# Patient Record
Sex: FEMALE | Race: WHITE | NOT HISPANIC OR LATINO | Employment: OTHER | URBAN - METROPOLITAN AREA
[De-identification: names, ages, dates, MRNs, and addresses within clinical notes are randomized per-mention and may not be internally consistent; named-entity substitution may affect disease eponyms.]

---

## 2017-01-23 ENCOUNTER — APPOINTMENT (OUTPATIENT)
Dept: RADIOLOGY | Facility: MEDICAL CENTER | Age: 72
End: 2017-01-23
Attending: FAMILY MEDICINE
Payer: MEDICARE

## 2017-03-09 ENCOUNTER — HOSPITAL ENCOUNTER (OUTPATIENT)
Dept: RADIOLOGY | Facility: MEDICAL CENTER | Age: 72
End: 2017-03-09

## 2017-03-13 ENCOUNTER — HOSPITAL ENCOUNTER (OUTPATIENT)
Dept: RADIOLOGY | Facility: MEDICAL CENTER | Age: 72
End: 2017-03-13

## 2017-03-20 ENCOUNTER — HOSPITAL ENCOUNTER (OUTPATIENT)
Dept: RADIOLOGY | Facility: MEDICAL CENTER | Age: 72
End: 2017-03-20
Attending: FAMILY MEDICINE
Payer: MEDICARE

## 2017-03-20 DIAGNOSIS — Z12.31 SCREENING MAMMOGRAM, ENCOUNTER FOR: ICD-10-CM

## 2017-03-20 PROCEDURE — 77063 BREAST TOMOSYNTHESIS BI: CPT

## 2017-07-14 ENCOUNTER — SLEEP CENTER VISIT (OUTPATIENT)
Dept: SLEEP MEDICINE | Facility: MEDICAL CENTER | Age: 72
End: 2017-07-14
Payer: MEDICARE

## 2017-07-14 ENCOUNTER — HOSPITAL ENCOUNTER (OUTPATIENT)
Dept: LAB | Facility: MEDICAL CENTER | Age: 72
End: 2017-07-14
Attending: FAMILY MEDICINE
Payer: MEDICARE

## 2017-07-14 VITALS
SYSTOLIC BLOOD PRESSURE: 138 MMHG | HEART RATE: 84 BPM | HEIGHT: 65 IN | BODY MASS INDEX: 41.99 KG/M2 | DIASTOLIC BLOOD PRESSURE: 60 MMHG | RESPIRATION RATE: 18 BRPM | WEIGHT: 252 LBS | TEMPERATURE: 98.2 F | OXYGEN SATURATION: 96 %

## 2017-07-14 DIAGNOSIS — G47.30 SLEEP APNEA, UNSPECIFIED TYPE: ICD-10-CM

## 2017-07-14 DIAGNOSIS — R06.83 SNORING: ICD-10-CM

## 2017-07-14 DIAGNOSIS — G47.10 HYPERSOMNOLENCE: ICD-10-CM

## 2017-07-14 LAB
ALBUMIN SERPL BCP-MCNC: 4.1 G/DL (ref 3.2–4.9)
ALBUMIN/GLOB SERPL: 1.2 G/DL
ALP SERPL-CCNC: 67 U/L (ref 30–99)
ALT SERPL-CCNC: 16 U/L (ref 2–50)
ANION GAP SERPL CALC-SCNC: 13 MMOL/L (ref 0–11.9)
AST SERPL-CCNC: 16 U/L (ref 12–45)
BASOPHILS # BLD AUTO: 0.5 % (ref 0–1.8)
BASOPHILS # BLD: 0.06 K/UL (ref 0–0.12)
BILIRUB SERPL-MCNC: 0.5 MG/DL (ref 0.1–1.5)
BUN SERPL-MCNC: 35 MG/DL (ref 8–22)
CALCIUM SERPL-MCNC: 9.3 MG/DL (ref 8.5–10.5)
CHLORIDE SERPL-SCNC: 104 MMOL/L (ref 96–112)
CO2 SERPL-SCNC: 22 MMOL/L (ref 20–33)
CREAT SERPL-MCNC: 1.31 MG/DL (ref 0.5–1.4)
EOSINOPHIL # BLD AUTO: 0.14 K/UL (ref 0–0.51)
EOSINOPHIL NFR BLD: 1.3 % (ref 0–6.9)
ERYTHROCYTE [DISTWIDTH] IN BLOOD BY AUTOMATED COUNT: 50.4 FL (ref 35.9–50)
EST. AVERAGE GLUCOSE BLD GHB EST-MCNC: 126 MG/DL
GFR SERPL CREATININE-BSD FRML MDRD: 40 ML/MIN/1.73 M 2
GLOBULIN SER CALC-MCNC: 3.4 G/DL (ref 1.9–3.5)
GLUCOSE SERPL-MCNC: 98 MG/DL (ref 65–99)
HBA1C MFR BLD: 6 % (ref 0–5.6)
HCT VFR BLD AUTO: 41.4 % (ref 37–47)
HGB BLD-MCNC: 13.4 G/DL (ref 12–16)
IMM GRANULOCYTES # BLD AUTO: 0.04 K/UL (ref 0–0.11)
IMM GRANULOCYTES NFR BLD AUTO: 0.4 % (ref 0–0.9)
IRON SERPL-MCNC: 71 UG/DL (ref 40–170)
LYMPHOCYTES # BLD AUTO: 2.22 K/UL (ref 1–4.8)
LYMPHOCYTES NFR BLD: 20.3 % (ref 22–41)
MCH RBC QN AUTO: 29.8 PG (ref 27–33)
MCHC RBC AUTO-ENTMCNC: 32.4 G/DL (ref 33.6–35)
MCV RBC AUTO: 92.2 FL (ref 81.4–97.8)
MONOCYTES # BLD AUTO: 0.66 K/UL (ref 0–0.85)
MONOCYTES NFR BLD AUTO: 6 % (ref 0–13.4)
NEUTROPHILS # BLD AUTO: 7.84 K/UL (ref 2–7.15)
NEUTROPHILS NFR BLD: 71.5 % (ref 44–72)
NRBC # BLD AUTO: 0 K/UL
NRBC BLD AUTO-RTO: 0 /100 WBC
PLATELET # BLD AUTO: 253 K/UL (ref 164–446)
PMV BLD AUTO: 11.8 FL (ref 9–12.9)
POTASSIUM SERPL-SCNC: 4.5 MMOL/L (ref 3.6–5.5)
PROT SERPL-MCNC: 7.5 G/DL (ref 6–8.2)
RBC # BLD AUTO: 4.49 M/UL (ref 4.2–5.4)
SODIUM SERPL-SCNC: 139 MMOL/L (ref 135–145)
VIT B12 SERPL-MCNC: 842 PG/ML (ref 211–911)
WBC # BLD AUTO: 11 K/UL (ref 4.8–10.8)

## 2017-07-14 PROCEDURE — 81291 MTHFR GENE: CPT

## 2017-07-14 PROCEDURE — 80053 COMPREHEN METABOLIC PANEL: CPT

## 2017-07-14 PROCEDURE — 83036 HEMOGLOBIN GLYCOSYLATED A1C: CPT | Mod: GA

## 2017-07-14 PROCEDURE — 36415 COLL VENOUS BLD VENIPUNCTURE: CPT

## 2017-07-14 PROCEDURE — 99204 OFFICE O/P NEW MOD 45 MIN: CPT | Performed by: INTERNAL MEDICINE

## 2017-07-14 PROCEDURE — 85025 COMPLETE CBC W/AUTO DIFF WBC: CPT

## 2017-07-14 PROCEDURE — 82607 VITAMIN B-12: CPT

## 2017-07-14 PROCEDURE — 83540 ASSAY OF IRON: CPT

## 2017-07-14 RX ORDER — DIPHENHYDRAMINE HCL 25 MG
25 CAPSULE ORAL EVERY 6 HOURS PRN
COMMUNITY
End: 2017-12-01

## 2017-07-14 NOTE — MR AVS SNAPSHOT
"        Kaia Sorto   2017 1:00 PM   Sleep Center Visit   MRN: [id number]    Department:  [facility]   Dept Phone:  [phone]    Description:  Female : 1945   Provider:  Julio Cesar ALVAREZ M.D.           Reason for Visit     New Patient Evaluate TALIA      Allergies as of 2017     Allergen Noted Reactions    Morphine 2011   Vomiting      You were diagnosed with     Sleep apnea, unspecified type   [4574948]       Hypersomnolence   [532336]       Snoring   [267132]         Vital Signs     Blood Pressure Pulse Temperature Respirations Height Weight    138/60 mmHg 84 36.8 °C (98.3 °F) 18 1.651 m (5' 5\") 114.306 kg (252 lb)    Body Mass Index Oxygen Saturation Smoking Status             41.93 kg/m2 96% Former Smoker         Basic Information     Date Of Birth Sex Race Ethnicity Preferred Language    1945 Female White Unknown English      Your appointments     Aug 10, 2017  2:00 PM   Sleep Study Home with SLEEP CLINIC   [facility] (--)    [address] [id number] [phone]            Aug 16, 2017  8:00 AM   Follow UP with MOE Jackson   [facility] (--)    [address] [id number] [phone]              Problem List              ICD-10-CM Priority Class Noted - Resolved    Carpal tunnel syndrome G56.00   11/3/2016 - Present      Health Maintenance        Date Due Completion Dates    IMM DTaP/Tdap/Td Vaccine (1 - Tdap) 3/3/1966 ---    PAP SMEAR 3/3/1968 ---    COLONOSCOPY 3/3/1997 ---    IMM ZOSTER VACCINE 3/3/2007 ---    BONE DENSITY 3/3/2012 ---    IMM PNEUMOCOCCAL 65+ (ADULT) LOW/MEDIUM RISK SERIES (1 of 2 - PCV13) 3/3/2012 ---    IMM INFLUENZA (1) 2017 ---    MAMMOGRAM 3/20/2018 3/20/2017, 10/18/2010, 2009, 2009, 2008, 2008, 2007, 2007            Current Immunizations     No immunizations on file.      Below and/or attached " are the medications your provider expects you to take. Review all of your home medications and newly ordered medications with your provider and/or pharmacist. Follow medication instructions as directed by your provider and/or pharmacist. Please keep your medication list with you and share with your provider. Update the information when medications are discontinued, doses are changed, or new medications (including over-the-counter products) are added; and carry medication information at all times in the event of emergency situations     Allergies:  MORPHINE - Vomiting               Medications  Valid as of: July 14, 2017 -  2:05 PM    Generic Name Brand Name Tablet Size Instructions for use    DiphenhydrAMINE HCl (Cap) BENADRYL 25 MG Take 25 mg by mouth every 6 hours as needed.        HydroCHLOROthiazide (Tab) HYDRODIURIL 25 MG Take 25 mg by mouth every day.        Levothyroxine Sodium (Tab) SYNTHROID 75 MCG Take 75 mcg by mouth Every morning on an empty stomach.        Valsartan (Tab) DIOVAN 320 MG Take 320 mg by mouth every day.        .                 Medicines prescribed today were sent to:     [facility]Y #[id number] - [facility] [address]    [address] [address]    Phone: [phone] Fax: [phone]    Open 24 Hours?: No      Medication refill instructions:       If your prescription bottle indicates you have medication refills left, it is not necessary to call your provider’s office. Please contact your pharmacy and they will refill your medication.    If your prescription bottle indicates you do not have any refills left, you may request refills at any time through one of the following ways: The online [employer] system (except Urgent Care), by calling your provider’s office, or by asking your pharmacy to contact your provider’s office with a refill request. Medication refills are processed only during regular business hours and may not be available until the next business day. Your provider  may request additional information or to have a follow-up visit with you prior to refilling your medication.   *Please Note: Medication refills are assigned a new Rx number when refilled electronically. Your pharmacy may indicate that no refills were authorized even though a new prescription for the same medication is available at the pharmacy. Please request the medicine by name with the pharmacy before contacting your provider for a refill.        Your To Do List     Future Labs/Procedures Complete By Expires    OVERNIGHT HOME SLEEP STUDY  As directed 7/14/2018         [facility] Status: Patient Declined

## 2017-07-17 NOTE — PROGRESS NOTES
CC:  Snoring and daytime somnolence, suspected sleep apnea hypopnea syndrome.    HPI:   Ms. Sorto is a 72-year-old woman referred by Dr. Elian Torres to assist in evaluation and management of suspected sleep-disordered breathing.    She is a long history of snoring and daytime somnolence.  About 2 years ago she underwent a home sleep test through her dentist's office.  A dental appliance was fabricated but she did not accommodate to the use of that device feeling that it caused intolerable oral discomfort.  We have partial results from a study that was done on August 18, 2015 that seems to show significant number of sleep breathing events.    She has a regular sleep schedule, as confirmed by the sleep diary, with bedtime between 11 PM and midnight.  She falls asleep within 30-60 minutes but does not perceive significant insomnia.  She awakens once or twice on an average night, sometimes for pet care.  She arises between 7 and 8 AM without fatigue, grogginess or morning headaches. She does have a history of snoring but we don't have any current information on possible cyclic breathing or apnea.  She is somewhat tired during the day and may doze off while reading or watching television.  She naps on a daily basis for about 30 minutes.  Her [place] sleepiness score is 5 points.  She drinks caffeinated beverages moderately and does not use substances to maintain wakefulness.  She does sometimes use over-the-counter Benadryl to facilitate sleep onset.  She does not have symptoms suggesting narcolepsy, parasomnia or restless leg syndrome.    She does have a history of systemic arterial hypertension treated with Diovan and hydrochlorothiazide.  She is on thyroid hormone replacement therapy and she has a history of painful heel spurs treated with nabumetone and that pain may interfere with her sleep.        Patient Active Problem List    Diagnosis Date Noted   • Carpal tunnel syndrome 11/03/2016       Past Medical  "History   Diagnosis Date   • Arthritis      wrists   • Hypertension    • Disorder of thyroid    • Depression    • Hypothyroidism    • Chickenpox    • Influenza    • Mumps    • Tonsillitis    • Snoring    • Sleep apnea        Past Surgical History   Procedure Laterality Date   • Other       bilateral cataracts removed   • Other abdominal surgery  2006     cholecystectomy   • Other orthopedic surgery Left 2005     rotator cuff repair   • Other orthopedic surgery Right 1978     rt shoulder dislocation repair   • Carpal tunnel endoscopic Right 11/3/2016     Procedure: ENDOSCOPIC CARPAL TUNNEL RELEASE;  Surgeon: Honorio Pritchard M.D.;  Location: SURGERY [facility];  Service:    • Cholecystectomy     • Carpal tunnel release         Family History   Problem Relation Age of Onset   • Heart Failure Mother    • Heart Disease Father    • Sleep Apnea Neg Hx        Social History     Social History   • Marital Status: Single     Spouse Name: N/A   • Number of Children: N/A   • Years of Education: N/A     Occupational History   • Not on file.     Social History Main Topics   • Smoking status: Former Smoker -- 0.25 packs/day for 10 years     Types: Cigarettes     Quit date: 01/01/1981   • Smokeless tobacco: Never Used   • Alcohol Use: Yes      Comment: Occ   • Drug Use: No   • Sexual Activity: Not on file     Other Topics Concern   • Not on file     Social History Narrative       Current Outpatient Prescriptions   Medication Sig Dispense Refill   • diphenhydrAMINE (BENADRYL ALLERGY) 25 MG capsule Take 25 mg by mouth every 6 hours as needed.     • levothyroxine (SYNTHROID) 75 MCG Tab Take 75 mcg by mouth Every morning on an empty stomach.     • valsartan (DIOVAN) 320 MG tablet Take 320 mg by mouth every day.     • hydrochlorothiazide (HYDRODIURIL) 25 MG TABS Take 25 mg by mouth every day.       No current facility-administered medications for this visit.    \"CURRENT RX\"      Allergies: Morphine      ROS  Positive for the " "sleep, cardiac, endocrine and orthopedic issues reviewed above.  She wears corrective lenses but has no diplopia.  She does not have regular rhinitis or tinnitus.  She denies heartburn or abdominal discomfort, unusual cough or dyspnea, chest pain or palpitations.  All other aspects of the CPT review of systems process are negative.      Physical Exam:   /60 mmHg  Pulse 84  Temp(Src) 36.8 °C (98.2 °F)  Resp 18  Ht 1.651 m (5' 5\")  Wt 114.306 kg (252 lb)  BMI 41.93 kg/m2  SpO2 96%. Neck circumference is 16.5 inches.  Head and neck examination demonstrates no mucosal lesion, purulent drainage or evident polyps. The pharynx is benign with a Mallampati III presentation. The neck is supple without thyromegaly. On chest examination there are symmetrical bilateral breath sounds without rales, wheezing or consolidation. On cardiac examination, the apical impulse and heart sounds are normal and the rhythm is regular. There is no murmur, gallop or rub and no jugular venous distention. The abdomen is soft with active bowel sounds and no palpable hepatosplenomegaly, mass, guarding or rebound. The extremities show no clubbing, cyanosis or edema and no signs of deep venous thrombosis. There is no warmth, redness, tenderness or palpable venous cord in the calves. The skin is clear, warm and dry. There is no unusual peripheral lymphadenopathy. Peripheral pulses are palpable in all 4 extremities. On neurologic examination, cranial nerve function is intact, motor tone is symmetrical, and the patient is alert, oriented and responsive.       Problems:  1. Sleep apnea, unspecified type  She presents with snoring and excessive daytime somnolence.  She has a crowded posterior pharyngeal airway, a neck circumference of 16.5 inches, and a body mass index of 41.9.  She has a history of systemic arterial hypertension and hypothyroidism.    2. Hypersomnolence  Probably related to the suspected sleep-disordered breathing.  She is " bending sufficient time in bed and does not seem to have major issues with sleep hygiene.    3. Snoring      Plan:   Home sleep test.    Return visit afterwards to discuss test results and treatment options.    We appreciate the opportunity to assist in her care.

## 2017-07-19 LAB
MTHFR C.1298A>C GENO BLD/T: ABNORMAL
MTHFR C.677C>T GENO BLD/T: NEGATIVE
MTHFR GENE MUT ANL BLD/T: ABNORMAL

## 2017-08-10 ENCOUNTER — HOME STUDY (OUTPATIENT)
Dept: SLEEP MEDICINE | Facility: MEDICAL CENTER | Age: 72
End: 2017-08-10
Attending: INTERNAL MEDICINE
Payer: MEDICARE

## 2017-08-10 DIAGNOSIS — R06.83 SNORING: ICD-10-CM

## 2017-08-10 DIAGNOSIS — G47.30 SLEEP APNEA, UNSPECIFIED TYPE: ICD-10-CM

## 2017-08-10 DIAGNOSIS — G47.10 HYPERSOMNOLENCE: ICD-10-CM

## 2017-08-10 PROCEDURE — G0399 HOME SLEEP TEST/TYPE 3 PORTA: HCPCS | Performed by: INTERNAL MEDICINE

## 2017-08-10 NOTE — MR AVS SNAPSHOT
Kaia Sorto   8/10/2017 2:00 PM   Appointment   MRN: [id number]    Department:  [facility]   Dept Phone:  [phone]    Description:  Female : 1945   Provider:  S[facility]EP CLINIC           Allergies as of 8/10/2017     Allergen Noted Reactions    Morphine 2011   Vomiting      You were diagnosed with     Sleep apnea, unspecified type   [[id number]]       Hypersomnolence   [837986]       Snoring   [589401]         Vital Signs     Smoking Status                   Former Smoker           Basic Information     Date Of Birth Sex Race Ethnicity Preferred Language    1945 Female White Unknown English      Your appointments     Aug 16, 2017  8:00 AM   Follow UP with MOE Jackson   [facility] (--)    [address]  [place] [id number] [phone]              Problem List              ICD-10-CM Priority Class Noted - Resolved    Carpal tunnel syndrome G56.00   11/3/2016 - Present      Health Maintenance        Date Due Completion Dates    IMM DTaP/Tdap/Td Vaccine (1 - Tdap) 3/3/1966 ---    PAP SMEAR 3/3/1968 ---    COLONOSCOPY 3/3/1997 ---    IMM ZOSTER VACCINE 3/3/2007 ---    BONE DENSITY 3/3/2012 ---    IMM PNEUMOCOCCAL 65+ (ADULT) LOW/MEDIUM RISK SERIES (1 of 2 - PCV13) 3/3/2012 ---    IMM INFLUENZA (1) 2017 ---    MAMMOGRAM 3/20/2018 3/20/2017, 10/18/2010, 2009, 2009, 2008, 2008, 2007, 2007            Current Immunizations     No immunizations on file.      Below and/or attached are the medications your provider expects you to take. Review all of your home medications and newly ordered medications with your provider and/or pharmacist. Follow medication instructions as directed by your provider and/or pharmacist. Please keep your medication list with you and share with your provider. Update the information when medications are discontinued, doses are changed, or new medications (including  over-the-counter products) are added; and carry medication information at all times in the event of emergency situations     Allergies:  MORPHINE - Vomiting               Medications  Valid as of: August 10, 2017 -  2:31 PM    Generic Name Brand Name Tablet Size Instructions for use    DiphenhydrAMINE HCl (Cap) BENADRYL 25 MG Take 25 mg by mouth every 6 hours as needed.        HydroCHLOROthiazide (Tab) HYDRODIURIL 25 MG Take 25 mg by mouth every day.        Levothyroxine Sodium (Tab) SYNTHROID 75 MCG Take 75 mcg by mouth Every morning on an empty stomach.        Valsartan (Tab) DIOVAN 320 MG Take 320 mg by mouth every day.        .                 Medicines prescribed today were sent to:     [facility]/PHARMACY #[id number] - [place], [place] - [address]    [address] [place] [address]    Phone: [phone] Fax: [phone]    Open 24 Hours?: No      Medication refill instructions:       If your prescription bottle indicates you have medication refills left, it is not necessary to call your provider’s office. Please contact your pharmacy and they will refill your medication.    If your prescription bottle indicates you do not have any refills left, you may request refills at any time through one of the following ways: The online [employer] system (except Urgent Care), by calling your provider’s office, or by asking your pharmacy to contact your provider’s office with a refill request. Medication refills are processed only during regular business hours and may not be available until the next business day. Your provider may request additional information or to have a follow-up visit with you prior to refilling your medication.   *Please Note: Medication refills are assigned a new Rx number when refilled electronically. Your pharmacy may indicate that no refills were authorized even though a new prescription for the same medication is available at the pharmacy. Please request the medicine by name with the pharmacy before  contacting your provider for a refill.           MyChart Status: Patient Declined

## 2017-08-11 NOTE — PROCEDURES
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 Repeat home polysomnography is requested for reevaluation of TALIA.    The patient has no contraindications to home sleep study monitoring.    A total recording time of over 6 hours was obtained with good quality data noted. She slept supine throughout the study.    Frequent snoring was identified associated with principally obstructive respiratory events. The overall AHI was 20 events per hour. These events were associated with desaturations for a total of 34 minutes, to a anupam of 73%. Average heart rate was 54 bpm.    Interpretation:  Moderately severe obstructive sleep apnea syndrome with AHI 20 events per hour associated with significant desaturations.    Recommendations:  Treatment options for TALIA include airway pressurization (CPAP versus AutoPap), potentially UPPP or dental appliance. The patient should return to the sleep clinic to discuss appropriate treatment.  Weight loss, avoidance of alcohol, sedatives, and muscle relaxants is additionally advised for treatment.

## 2017-08-16 ENCOUNTER — SLEEP CENTER VISIT (OUTPATIENT)
Dept: SLEEP MEDICINE | Facility: MEDICAL CENTER | Age: 72
End: 2017-08-16
Payer: MEDICARE

## 2017-08-16 VITALS
OXYGEN SATURATION: 95 % | RESPIRATION RATE: 16 BRPM | SYSTOLIC BLOOD PRESSURE: 132 MMHG | HEIGHT: 65 IN | HEART RATE: 82 BPM | DIASTOLIC BLOOD PRESSURE: 78 MMHG | WEIGHT: 245 LBS | BODY MASS INDEX: 40.82 KG/M2

## 2017-08-16 DIAGNOSIS — I10 ESSENTIAL HYPERTENSION: ICD-10-CM

## 2017-08-16 DIAGNOSIS — G47.33 OBSTRUCTIVE SLEEP APNEA: ICD-10-CM

## 2017-08-16 DIAGNOSIS — E03.9 HYPOTHYROIDISM, UNSPECIFIED TYPE: ICD-10-CM

## 2017-08-16 PROCEDURE — 99213 OFFICE O/P EST LOW 20 MIN: CPT | Performed by: NURSE PRACTITIONER

## 2017-08-16 NOTE — PROGRESS NOTES
Chief Complaint   Patient presents with   • Results       HPI:  Kaia Sorto is a 72 y.o. year old female here today for follow-up on sleep study results.   She is a long history of snoring and daytime somnolence.  About 2 years ago she underwent a home sleep test through her dentist's office.  A dental appliance was fabricated but she did not accommodate to the use of that device feeling that it caused intolerable oral discomfort.  We have partial results from a study that was done on August 18, 2015 that seems to show significant number of sleep breathing events. She also had oral surgery for snoring/sleep apnea 20 yrs ago.    She does have a history of systemic arterial hypertension treated with Diovan and hydrochlorothiazide.  She is on thyroid hormone replacement therapy and she has a history of painful heel spurs treated with nabumetone and that pain may interfere with her sleep.    Home sleep study 8/10/2017 indicating moderate obstructive sleep apnea with an overall AHI 20.2 and a minimum oxygen saturation of 73%. Patient spent 34 minutes is 88% oxygen saturation. Snoring noted. Average pulse rate of 54 and max 87. I reviewed testing with patient treatment modalities including weight loss, CPAP, dental appliance or surgery. Due to her history of oral surgery and dental appliance and effectiveness we will pursue CPAP and weight loss. She currently is with [employer] and has lost 5 pounds since last office visit. She denies any changes in health since last office visit. She overall feels well. She denies cardiorespiratory issues.        ROS: As per HPI and otherwise negative if not stated.    Past Medical History   Diagnosis Date   • Arthritis      wrists   • Hypertension    • Disorder of thyroid    • Depression    • Hypothyroidism    • Chickenpox    • Influenza    • Mumps    • Tonsillitis    • Snoring    • Sleep apnea        Past Surgical History   Procedure Laterality Date   • Other        "bilateral cataracts removed   • Other abdominal surgery  2006     cholecystectomy   • Other orthopedic surgery Left 2005     rotator cuff repair   • Other orthopedic surgery Right 1978     rt shoulder dislocation repair   • Carpal tunnel endoscopic Right 11/3/2016     Procedure: ENDOSCOPIC CARPAL TUNNEL RELEASE;  Surgeon: Honorio Pritchard M.D.;  Location: SURGERY [facility];  Service:    • Cholecystectomy     • Carpal tunnel release         Family History   Problem Relation Age of Onset   • Heart Failure Mother    • Heart Disease Father    • Sleep Apnea Neg Hx        Social History     Social History   • Marital Status: Single     Spouse Name: N/A   • Number of Children: N/A   • Years of Education: N/A     Occupational History   • Not on file.     Social History Main Topics   • Smoking status: Former Smoker -- 0.25 packs/day for 10 years     Types: Cigarettes     Quit date: 01/01/1981   • Smokeless tobacco: Never Used   • Alcohol Use: Yes      Comment: Occ   • Drug Use: No   • Sexual Activity: Not on file     Other Topics Concern   • Not on file     Social History Narrative       Allergies as of 08/16/2017 - Guillermo as Reviewed 08/16/2017   Allergen Reaction Noted   • Morphine Vomiting 12/03/2011        @Vital signs for this encounter:  Filed Vitals:    08/16/17 0805   Height: 1.651 m (5' 5\")   Weight: 111.131 kg (245 lb)   Weight % change since last entry.: 0 %   BP: 132/78   Pulse: 82   BMI (Calculated): 40.77   Resp: 16   O2 sat % room air: 95 %       Current medications as of today   Current Outpatient Prescriptions   Medication Sig Dispense Refill   • diphenhydrAMINE (BENADRYL ALLERGY) 25 MG capsule Take 25 mg by mouth every 6 hours as needed.     • levothyroxine (SYNTHROID) 75 MCG Tab Take 75 mcg by mouth Every morning on an empty stomach.     • valsartan (DIOVAN) 320 MG tablet Take 320 mg by mouth every day.     • hydrochlorothiazide (HYDRODIURIL) 25 MG TABS Take 25 mg by mouth every day.       No current " facility-administered medications for this visit.         Physical Exam:   Gen:           Alert and oriented, No apparent distress. Mood and affect appropriate, normal interaction with examiner.  Eyes:          PERRL, EOM intact, sclere white, conjunctive moist.  Ears:          Not examined.  Hearing:     Grossly intact.  Nose:          Normal, no lesions or deformities.  Dentition:    Good dentition.  Oropharynx:   Tongue normal, posterior pharynx without erythema or exudate.  Mallampati Classification: 3  Neck:        Supple, trachea midline, no masses.  Respiratory Effort: No intercostal retractions or use of accessory muscles.   Lung Auscultation:      Clear to auscultation bilaterally; no rales, rhonchi or wheezing.  CV:            Regular rate and rhythm. No murmurs, rubs or gallops.  Abd:           Not examined.   Lymphadenopathy: Not examined.  Gait and Station: Normal.  Digits and Nails: No clubbing, cyanosis, petechiae, or nodes.   Cranial Nerves: II-XII grossly intact.  Skin:        No rashes, lesions or ulcers noted.               Ext:           No cyanosis or edema.      Assessment:  1. Obstructive sleep apnea  DME CPAP   2. Essential hypertension     3. Hypothyroidism, unspecified type     4. Adult BMI 40.0-44.9 kg/sq m (CMS-[facility])  HEIGHT AND WEIGHT       Immunizations:    Flu:2016  Pneumovax 23:curent  Prevnar 13:current    Plan:  1. DME Autocpap 5-15cm H20 nightly. Patient understands they may have mask fits within first 30 days of therapy covered by insurance to obtain best fit.  2. Discussed sleep hygiene.  3. Encouraged continued weight loss.  4. Follow up in 6-8 weeks with compliance card, sooner if needed. F/u oximetry will be needed once acclimated to therapy.

## 2017-08-16 NOTE — MR AVS SNAPSHOT
"        Kaia Sorto   2017 8:00 AM   Sleep Center Visit   MRN: [id number]    Department:  [facility]   Dept Phone:  [phone]    Description:  Female : 1945   Provider:  LESTER JacksonREDMUNDO           Reason for Visit     Results           Allergies as of 2017     Allergen Noted Reactions    Morphine 2011   Vomiting      You were diagnosed with     Obstructive sleep apnea   [734797]       Essential hypertension   [0907086]       Hypothyroidism, unspecified type   [0126576]       Adult BMI 40.0-44.9 kg/sq m (CMS-HCC)   [180194]         Vital Signs     Blood Pressure Pulse Respirations Height Weight Body Mass Index    132/78 mmHg 82 16 1.651 m (5' 5\") 111.131 kg (245 lb) 40.77 kg/m2    Oxygen Saturation Smoking Status                95% Former Smoker          Basic Information     Date Of Birth Sex Race Ethnicity Preferred Language    1945 Female White Unknown English      Your appointments     Oct 23, 2017 11:00 AM   Follow UP with MOE Hubbard   [facility] (--)    [address] [facility] [id number] [phone]              Problem List              ICD-10-CM Priority Class Noted - Resolved    Carpal tunnel syndrome G56.00   11/3/2016 - Present    Obstructive sleep apnea G47.33   2017 - Present    Essential hypertension I10   2017 - Present    Hypothyroidism E03.9   2017 - Present    Adult BMI 40.0-44.9 kg/sq m (CMS-HCC) Z68.41   2017 - Present      Health Maintenance        Date Due Completion Dates    IMM DTaP/Tdap/Td Vaccine (1 - Tdap) 3/3/1966 ---    PAP SMEAR 3/3/1968 ---    COLONOSCOPY 3/3/1997 ---    IMM ZOSTER VACCINE 3/3/2007 ---    BONE DENSITY 3/3/2012 ---    IMM PNEUMOCOCCAL 65+ (ADULT) LOW/MEDIUM RISK SERIES (1 of 2 - PCV13) 3/3/2012 ---    IMM INFLUENZA (1) 2017 ---    MAMMOGRAM 3/20/2018 3/20/2017, 10/18/2010, 2009, 2009, 2008, 2008, 2007, 2007   "            Current Immunizations     No immunizations on file.      Below and/or attached are the medications your provider expects you to take. Review all of your home medications and newly ordered medications with your provider and/or pharmacist. Follow medication instructions as directed by your provider and/or pharmacist. Please keep your medication list with you and share with your provider. Update the information when medications are discontinued, doses are changed, or new medications (including over-the-counter products) are added; and carry medication information at all times in the event of emergency situations     Allergies:  MORPHINE - Vomiting               Medications  Valid as of: August 16, 2017 -  8:33 AM    Generic Name Brand Name Tablet Size Instructions for use    DiphenhydrAMINE HCl (Cap) BENADRYL 25 MG Take 25 mg by mouth every 6 hours as needed.        HydroCHLOROthiazide (Tab) HYDRODIURIL 25 MG Take 25 mg by mouth every day.        Levothyroxine Sodium (Tab) SYNTHROID 75 MCG Take 75 mcg by mouth Every morning on an empty stomach.        Valsartan (Tab) DIOVAN 320 MG Take 320 mg by mouth every day.        .                 Medicines prescribed today were sent to:     [facility] [address] [place] [address] [address] [address]    Phone: [phone] Fax: [phone]    Open 24 Hours?: No      Medication refill instructions:       If your prescription bottle indicates you have medication refills left, it is not necessary to call your provider’s office. Please contact your pharmacy and they will refill your medication.    If your prescription bottle indicates you do not have any refills left, you may request refills at any time through one of the following ways: The online [employer] system (except Urgent Care), by calling your provider’s office, or by asking your pharmacy to contact your provider’s office with a refill request. Medication refills are processed only  during regular business hours and may not be available until the next business day. Your provider may request additional information or to have a follow-up visit with you prior to refilling your medication.   *Please Note: Medication refills are assigned a new Rx number when refilled electronically. Your pharmacy may indicate that no refills were authorized even though a new prescription for the same medication is available at the pharmacy. Please request the medicine by name with the pharmacy before contacting your provider for a refill.           CredSimple Access Code: [email or web address]  Expires: 9/14/2017 11:23 AM    CredSimple  A secure, online tool to manage your health information     [employer]’s CredSimple® is a secure, online tool that connects you to your personalized health information from the privacy of your home -- day or night - making it very easy for you to manage your healthcare. Once the activation process is completed, you can even access your medical information using the CredSimple roas, which is available for free in the [employer] Rosa store or Google Play store.     CredSimple provides the following levels of access (as shown below):   My Chart Features   [facility] Doctor [facility]  Specialists [facility]  Urgent  Care Non-[facility]  Primary Care  Doctor   Email your healthcare team securely and privately 24/7 X X X    Manage appointments: schedule your next appointment; view details of past/upcoming appointments X      Request prescription refills. X      View recent personal medical records, including lab and immunizations X X X X   View health record, including health history, allergies, medications X X X X   Read reports about your outpatient visits, procedures, consult and ER notes X X X X   See your discharge summary, which is a recap of your hospital and/or ER visit that includes your diagnosis, lab results, and care plan. X X       How to register for CredSimple:  1. Go to   [email or web address].  2. Click on the Sign Up Now box, which takes you to the New Member Sign Up page. You will need to provide the following information:  a. Enter your Konjekt Access Code exactly as it appears at the top of this page. (You will not need to use this code after you’ve completed the sign-up process. If you do not sign up before the expiration date, you must request a new code.)   b. Enter your date of birth.   c. Enter your home email address.   d. Click Submit, and follow the next screen’s instructions.  3. Create a Konjekt ID. This will be your Konjekt login ID and cannot be changed, so think of one that is secure and easy to remember.  4. Create a [employer] password. You can change your password at any time.  5. Enter your Password Reset Question and Answer. This can be used at a later time if you forget your password.   6. Enter your e-mail address. This allows you to receive e-mail notifications when new information is available in Konjekt.  7. Click Sign Up. You can now view your health information.    For assistance activating your Konjekt account, call [phone]

## 2017-11-06 ENCOUNTER — HOSPITAL ENCOUNTER (OUTPATIENT)
Dept: RADIOLOGY | Facility: MEDICAL CENTER | Age: 72
End: 2017-11-06
Attending: FAMILY MEDICINE
Payer: MEDICARE

## 2017-11-06 DIAGNOSIS — N63.0 BREAST LUMP: ICD-10-CM

## 2017-11-06 PROCEDURE — 76642 ULTRASOUND BREAST LIMITED: CPT | Mod: RT

## 2017-11-06 PROCEDURE — G0206 DX MAMMO INCL CAD UNI: HCPCS | Mod: GA,RT

## 2017-11-16 ENCOUNTER — OFFICE VISIT (OUTPATIENT)
Dept: PULMONOLOGY | Facility: HOSPICE | Age: 72
End: 2017-11-16
Payer: MEDICARE

## 2017-11-16 ENCOUNTER — TELEPHONE (OUTPATIENT)
Dept: PULMONOLOGY | Facility: HOSPICE | Age: 72
End: 2017-11-16

## 2017-11-16 VITALS
WEIGHT: 246 LBS | HEIGHT: 65 IN | SYSTOLIC BLOOD PRESSURE: 144 MMHG | RESPIRATION RATE: 16 BRPM | DIASTOLIC BLOOD PRESSURE: 82 MMHG | BODY MASS INDEX: 40.98 KG/M2 | OXYGEN SATURATION: 94 % | HEART RATE: 58 BPM

## 2017-11-16 DIAGNOSIS — I10 ESSENTIAL HYPERTENSION: ICD-10-CM

## 2017-11-16 DIAGNOSIS — G47.33 OBSTRUCTIVE SLEEP APNEA: ICD-10-CM

## 2017-11-16 PROCEDURE — 99213 OFFICE O/P EST LOW 20 MIN: CPT | Performed by: NURSE PRACTITIONER

## 2017-11-16 RX ORDER — LANOLIN ALCOHOL/MO/W.PET/CERES
3 CREAM (GRAM) TOPICAL
COMMUNITY

## 2017-11-16 NOTE — PROGRESS NOTES
Chief Complaint   Patient presents with   • Apnea       HPI:  Kaia Sorto is a 72 y.o. year old female here today for follow-up on her obstructive sleep apnea. She is a long history of snoring and daytime somnolence.  About 2 years ago she underwent a home sleep test through her dentist's office.  A dental appliance was fabricated but she did not accommodate to the use of that device feeling that it caused intolerable oral discomfort. She also had oral surgery for snoring/sleep apnea 20 yrs ago.     Home sleep study 8/10/2017 indicating moderate obstructive sleep apnea with an overall AHI 20.2 and a minimum oxygen saturation of 73%. Patient spent 34 minutes with saturations less that 88%. She was last seen in the office 8/16/2017 with Heide CARRIZALES. She was started on Auto CPAP 5-15 CM H20. She did not bring her compliance chip to today's office visit. She does continue to wake every 2 hours. She feels her mask strap slides up and her mask leaks. She prefers a nasal mask. She tolerates the pressure well. She is not feeling more refreshed as she is waking frequently during the night to adjust her mask. She is still tired during the day. She notes occasional morning headaches. 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 She states she often does not where her mask the night before work and she feels more fatigued the next day.     Past Medical History:   Diagnosis Date   • Arthritis     wrists   • Chickenpox    • Depression    • Disorder of thyroid    • Hypertension    • Hypothyroidism    • Influenza    • Mumps    • Sleep apnea    • Snoring    • Tonsillitis        Past Surgical History:   Procedure Laterality Date   • CARPAL TUNNEL ENDOSCOPIC Right 11/3/2016    Procedure: ENDOSCOPIC CARPAL TUNNEL RELEASE;  Surgeon: Honorio Pritchard M.D.;  Location: SURGERY [facility];  Service:    • OTHER ABDOMINAL SURGERY  2006    cholecystectomy   • OTHER ORTHOPEDIC SURGERY Left 2005    rotator cuff repair   •  OTHER ORTHOPEDIC SURGERY Right 1978    rt shoulder dislocation repair   • CARPAL TUNNEL RELEASE     • CHOLECYSTECTOMY     • OTHER      bilateral cataracts removed       Family History   Problem Relation Age of Onset   • Heart Failure Mother    • Heart Disease Father    • Sleep Apnea Neg Hx        Social History     Social History   • Marital status: Single     Spouse name: N/A   • Number of children: N/A   • Years of education: N/A     Occupational History   • Not on file.     Social History Main Topics   • Smoking status: Former Smoker     Packs/day: 0.25     Years: 10.00     Types: Cigarettes     Quit date: 1/1/1981   • Smokeless tobacco: Never Used   • Alcohol use 0.0 - 0.6 oz/week      Comment: Occ   • Drug use: No   • Sexual activity: Not on file     Other Topics Concern   • Not on file     Social History Narrative   • No narrative on file       ROS:  Constitutional: Denies fevers, chills, sweats, weight loss  Eyes: Denies glasses, vision loss, pain, drainage, double vision  Ears/Nose/Mouth/Throat: Denies rhinitis, nasal congestion, ear ache, difficulty hearing, sore throat, persistent hoarseness, decayed teeth/toothache  Cardiovascular: Denies chest pain, tightness, palpitations, swelling in feet/legs, fainting, difficulty breathing when laying down  Respiratory: Denies shortness of breath, cough, sputum, wheezing, painful breathing, coughing up blood  GI: Denies heartburn, difficulty swallowing, nausea, vomiting, abdominal pain, diarrhea, constipation  : Denies frequent urination, painful urination  Integumentary: Denies rashes, lumps or color changes  MSK: Denies painful joints, sore muscles, and back pain.   Neurological: Denies frequent headaches, dizziness, weakness  Sleep: See HPI       Current Outpatient Prescriptions   Medication Sig Dispense Refill   • melatonin 3 MG Tab Take 3 mg by mouth every bedtime.     • diphenhydrAMINE (BENADRYL ALLERGY) 25 MG capsule Take 25 mg by mouth every 6 hours as  "needed.     • levothyroxine (SYNTHROID) 75 MCG Tab Take 75 mcg by mouth Every morning on an empty stomach.     • valsartan (DIOVAN) 320 MG tablet Take 320 mg by mouth every day.     • hydrochlorothiazide (HYDRODIURIL) 25 MG TABS Take 25 mg by mouth every day.       No current facility-administered medications for this visit.        Allergies   Allergen Reactions   • Morphine Vomiting       Blood pressure 144/82, pulse (!) 58, resp. rate 16, height 1.651 m (5' 5\"), weight 111.6 kg (246 lb), SpO2 94 %.    PE:   Appearance: Well developed, well nourished, no acute distress  Eyes: PERRL, EOM intact, sclera white, conjunctiva moist  Ears: no lesions or deformities  Hearing: grossly intact  Nose: no lesions or deformities  Oropharynx: tongue normal, posterior pharynx without erythema or exudate  Mallampati Classification: class 4   Neck: supple, trachea midline, no masses   Respiratory effort: no intercostal retractions or use of accessory muscles  Lung auscultation: no rales, rhonchi or wheezes  Heart auscultation: no murmur rub or gallop  Extremities: no cyanosis or edema  Abdomen: soft ,non tender, no masses  Gait and Station: normal  Digits and nails: no clubbing, cyanosis, petechiae or nodes.  Cranial nerves: grossly intact  Skin: no rashes, lesions or ulcers noted  Orientation: Oriented to time, person and place  Mood and affect: mood and affect appropriate, normal interaction with examiner  Judgement: Intact          Assessment:  1. Obstructive sleep apnea  OVERNIGHT OXIMETRY    DME MASK AND SUPPLIES   2. Essential hypertension     3. Adult BMI 40.0-44.9 kg/sq m (CMS-[facility])           Plan:    1) She has the dreamwear mask which she feels is comfortable, but moves during the night and is adding to nocturnal awakenings. Order for her DME to fit headgear as she may need a smaller headgear and add dreamwear pillows.   2) Continue Auto CPAP 5-15 CM H20. We have requested a download from her DME. She is encouraged to bring " her chip to her follow up visit. CNOX in 1 month on current setting to ensure adequate saturations.  3) Sleep hygiene discussed. Weight loss recommended. She states she has been working on weight loss.   4) Follow up in 8 weeks with results of overnight oximetry and compliance card download, sooner if needed.

## 2017-11-17 NOTE — PATIENT INSTRUCTIONS
Plan:    1) She has the dreamwear mask which she feels is comfortable, but moves during the night and is adding to nocturnal awakenings. Order for her DME to fit headgear as she may need a smaller headgear and add dreamwear pillows.   2) Continue Auto CPAP 5-15 CM H20. We have requested a download from her DME. She is encouraged to bring her chip to her follow up visit. CNOX in 1 month on current setting to ensure adequate saturations.  3) Sleep hygiene discussed. Weight loss recommended. She states she has been working on weight loss.   4) Follow up in 8 weeks with results of overnight oximetry and compliance card download, sooner if needed.

## 2017-11-17 NOTE — TELEPHONE ENCOUNTER
We have requested results of her compliance download from her DME.   Please return message to me when results are in so I can review.

## 2017-11-20 NOTE — TELEPHONE ENCOUNTER
I called and spoke to Kaia and gave her the below information. She said that she is relieved that her events are being well managed and says now that she changed masks she has been able to sleep longer periods of time and fall back to sleep faster when she does wake up.

## 2017-11-26 ENCOUNTER — OFFICE VISIT (OUTPATIENT)
Dept: URGENT CARE | Facility: CLINIC | Age: 72
End: 2017-11-26
Payer: MEDICARE

## 2017-11-26 VITALS
TEMPERATURE: 98 F | OXYGEN SATURATION: 96 % | DIASTOLIC BLOOD PRESSURE: 80 MMHG | HEART RATE: 78 BPM | RESPIRATION RATE: 20 BRPM | SYSTOLIC BLOOD PRESSURE: 140 MMHG

## 2017-11-26 DIAGNOSIS — J20.9 ACUTE BRONCHITIS, UNSPECIFIED ORGANISM: ICD-10-CM

## 2017-11-26 PROCEDURE — 99214 OFFICE O/P EST MOD 30 MIN: CPT | Performed by: PHYSICIAN ASSISTANT

## 2017-11-26 RX ORDER — AZITHROMYCIN 250 MG/1
TABLET, FILM COATED ORAL
Qty: 6 TAB | Refills: 0 | Status: SHIPPED | OUTPATIENT
Start: 2017-11-26 | End: 2017-12-01

## 2017-11-26 RX ORDER — BENZONATATE 200 MG/1
200 CAPSULE ORAL 3 TIMES DAILY PRN
Qty: 30 CAP | Refills: 0 | Status: SHIPPED | OUTPATIENT
Start: 2017-11-26 | End: 2017-12-01

## 2017-11-26 ASSESSMENT — ENCOUNTER SYMPTOMS
FEVER: 0
SHORTNESS OF BREATH: 0
PALPITATIONS: 0
HEMOPTYSIS: 0
SORE THROAT: 1
SPUTUM PRODUCTION: 1
COUGH: 1
WHEEZING: 0
CHILLS: 1

## 2017-11-26 NOTE — PROGRESS NOTES
Subjective:      Kaia Sorto is a 72 y.o. female who presents with Cough (Since yesterday wet cough +)            Cough   This is a new problem. The current episode started yesterday. The problem has been gradually worsening. The problem occurs constantly. The cough is productive of purulent sputum. Associated symptoms include chills and a sore throat. Pertinent negatives include no chest pain, ear pain, fever, hemoptysis, shortness of breath or wheezing. The symptoms are aggravated by lying down. She has tried OTC cough suppressant for the symptoms. The treatment provided no relief. Her past medical history is significant for bronchitis.       Review of Systems   Constitutional: Positive for chills and malaise/fatigue. Negative for fever.   HENT: Positive for congestion and sore throat. Negative for ear pain.    Respiratory: Positive for cough and sputum production. Negative for hemoptysis, shortness of breath and wheezing.    Cardiovascular: Negative for chest pain and palpitations.   All other systems reviewed and are negative.    PMH:  has a past medical history of Arthritis; Chickenpox; Depression; Disorder of thyroid; Hypertension; Hypothyroidism; Influenza; Mumps; Sleep apnea; Snoring; and Tonsillitis.  MEDS:   Current Outpatient Prescriptions:   •  benzonatate (TESSALON) 200 MG capsule, Take 1 Cap by mouth 3 times a day as needed for Cough., Disp: 30 Cap, Rfl: 0  •  azithromycin (ZITHROMAX) 250 MG Tab, Take 500 mg (two tablets) on day one and then take 250 mg (1 tablet) for 4 days., Disp: 6 Tab, Rfl: 0  •  melatonin 3 MG Tab, Take 3 mg by mouth every bedtime., Disp: , Rfl:   •  levothyroxine (SYNTHROID) 75 MCG Tab, Take 75 mcg by mouth Every morning on an empty stomach., Disp: , Rfl:   •  valsartan (DIOVAN) 320 MG tablet, Take 320 mg by mouth every day., Disp: , Rfl:   •  hydrochlorothiazide (HYDRODIURIL) 25 MG TABS, Take 25 mg by mouth every day., Disp: , Rfl:   •  diphenhydrAMINE (BENADRYL ALLERGY)  25 MG capsule, Take 25 mg by mouth every 6 hours as needed., Disp: , Rfl:   ALLERGIES:   Allergies   Allergen Reactions   • Morphine Vomiting     SURGHX:   Past Surgical History:   Procedure Laterality Date   • CARPAL TUNNEL ENDOSCOPIC Right 11/3/2016    Procedure: ENDOSCOPIC CARPAL TUNNEL RELEASE;  Surgeon: Honorio Pritchard M.D.;  Location: SURGERY [facility];  Service:    • OTHER ABDOMINAL SURGERY  2006    cholecystectomy   • OTHER ORTHOPEDIC SURGERY Left 2005    rotator cuff repair   • OTHER ORTHOPEDIC SURGERY Right 1978    rt shoulder dislocation repair   • CARPAL TUNNEL RELEASE     • CHOLECYSTECTOMY     • OTHER      bilateral cataracts removed     SOCHX:  reports that she quit smoking about 36 years ago. Her smoking use included Cigarettes. She has a 2.50 pack-year smoking history. She has never used smokeless tobacco. She reports that she drinks alcohol. She reports that she does not use drugs.  FH: Family history was reviewed, no pertinent findings to report  Medications, Allergies, and current problem list reviewed today in Epic       Objective:     /80   Pulse 78   Temp 36.7 °C (98 °F)   Resp 20   SpO2 96%      Physical Exam   Constitutional: She is oriented to person, place, and time. She appears well-developed and well-nourished. She is active.  Non-toxic appearance. She does not have a sickly appearance. She does not appear ill. No distress. She is not intubated.   HENT:   Head: Normocephalic and atraumatic.   Right Ear: Hearing, tympanic membrane, external ear and ear canal normal.   Left Ear: Hearing, tympanic membrane, external ear and ear canal normal.   Nose: Nose normal.   Mouth/Throat: Uvula is midline, oropharynx is clear and moist and mucous membranes are normal.   Eyes: Conjunctivae, EOM and lids are normal.   Neck: Normal range of motion. Neck supple.   Cardiovascular: Regular rhythm, S1 normal, S2 normal and normal heart sounds.  Exam reveals no gallop and no friction rub.     No murmur heard.  Pulmonary/Chest: Effort normal and breath sounds normal. No accessory muscle usage. No apnea, no tachypnea and no bradypnea. She is not intubated. No respiratory distress. She has no decreased breath sounds. She has no wheezes. She has no rhonchi. She has no rales. She exhibits no tenderness.   Musculoskeletal: Normal range of motion.   Neurological: She is alert and oriented to person, place, and time.   Skin: Skin is warm and dry.   Psychiatric: She has a normal mood and affect. Her speech is normal and behavior is normal. Judgment and thought content normal.   Vitals reviewed.              Assessment/Plan:   Discussed her symptoms are most likely viral in etiology given length of cough and normal exam. Contingent antibiotic prescription given to patient to fill upon meeting criteria of guidelines discussed.     1. Acute bronchitis, unspecified organism    - benzonatate (TESSALON) 200 MG capsule; Take 1 Cap by mouth 3 times a day as needed for Cough.  Dispense: 30 Cap; Refill: 0  - azithromycin (ZITHROMAX) 250 MG Tab; Take 500 mg (two tablets) on day one and then take 250 mg (1 tablet) for 4 days.  Dispense: 6 Tab; Refill: 0    Differential diagnosis, natural history, supportive care, and indications for immediate follow-up discussed at length.   Follow-up with primary care provider within 4-5 days, emergency room precautions discussed.  Patient and/or family appears understanding of information.

## 2017-11-26 NOTE — PATIENT INSTRUCTIONS
Acute Bronchitis  Bronchitis is inflammation of the airways that extend from the windpipe into the lungs (bronchi). The inflammation often causes mucus to develop. This leads to a cough, which is the most common symptom of bronchitis.   In acute bronchitis, the condition usually develops suddenly and goes away over time, usually in a couple weeks. Smoking, allergies, and asthma can make bronchitis worse. Repeated episodes of bronchitis may cause further lung problems.   CAUSES  Acute bronchitis is most often caused by the same virus that causes a cold. The virus can spread from person to person (contagious) through coughing, sneezing, and touching contaminated objects.  SIGNS AND SYMPTOMS   · Cough.    · Fever.    · Coughing up mucus.    · Body aches.    · Chest congestion.    · Chills.    · Shortness of breath.    · Sore throat.    DIAGNOSIS   Acute bronchitis is usually diagnosed through a physical exam. Your health care provider will also ask you questions about your medical history. Tests, such as chest X-rays, are sometimes done to rule out other conditions.   TREATMENT   Acute bronchitis usually goes away in a couple weeks. Oftentimes, no medical treatment is necessary. Medicines are sometimes given for relief of fever or cough. Antibiotic medicines are usually not needed but may be prescribed in certain situations. In some cases, an inhaler may be recommended to help reduce shortness of breath and control the cough. A cool mist vaporizer may also be used to help thin bronchial secretions and make it easier to clear the chest.   HOME CARE INSTRUCTIONS  · Get plenty of rest.    · Drink enough fluids to keep your urine clear or pale yellow (unless you have a medical condition that requires fluid restriction). Increasing fluids may help thin your respiratory secretions (sputum) and reduce chest congestion, and it will prevent dehydration.    · Take medicines only as directed by your health care provider.  · If  you were prescribed an antibiotic medicine, finish it all even if you start to feel better.  · Avoid smoking and secondhand smoke. Exposure to cigarette smoke or irritating chemicals will make bronchitis worse. If you are a smoker, consider using nicotine gum or skin patches to help control withdrawal symptoms. Quitting smoking will help your lungs heal faster.    · Reduce the chances of another bout of acute bronchitis by washing your hands frequently, avoiding people with cold symptoms, and trying not to touch your hands to your mouth, nose, or eyes.    · Keep all follow-up visits as directed by your health care provider.    SEEK MEDICAL CARE IF:  Your symptoms do not improve after 1 week of treatment.   SEEK IMMEDIATE MEDICAL CARE IF:  · You develop an increased fever or chills.    · You have chest pain.    · You have severe shortness of breath.  · You have bloody sputum.    · You develop dehydration.  · You faint or repeatedly feel like you are going to pass out.  · You develop repeated vomiting.  · You develop a severe headache.  MAKE SURE YOU:   · Understand these instructions.  · Will watch your condition.  · Will get help right away if you are not doing well or get worse.     This information is not intended to replace advice given to you by your health care provider. Make sure you discuss any questions you have with your health care provider.     Document Released: 01/25/2006 Document Revised: 01/08/2016 Document Reviewed: 06/10/2014  [employer] Interactive Patient Education ©2016 [employer]

## 2017-12-01 ENCOUNTER — OFFICE VISIT (OUTPATIENT)
Dept: URGENT CARE | Facility: CLINIC | Age: 72
End: 2017-12-01
Payer: MEDICARE

## 2017-12-01 VITALS
WEIGHT: 246 LBS | OXYGEN SATURATION: 97 % | DIASTOLIC BLOOD PRESSURE: 60 MMHG | TEMPERATURE: 97.2 F | HEIGHT: 65 IN | HEART RATE: 80 BPM | BODY MASS INDEX: 40.98 KG/M2 | RESPIRATION RATE: 16 BRPM | SYSTOLIC BLOOD PRESSURE: 120 MMHG

## 2017-12-01 DIAGNOSIS — J98.8 RTI (RESPIRATORY TRACT INFECTION): ICD-10-CM

## 2017-12-01 DIAGNOSIS — I88.9 LYMPHADENITIS: ICD-10-CM

## 2017-12-01 DIAGNOSIS — J20.9 ACUTE WHEEZY BRONCHITIS: ICD-10-CM

## 2017-12-01 PROCEDURE — 94640 AIRWAY INHALATION TREATMENT: CPT | Performed by: FAMILY MEDICINE

## 2017-12-01 PROCEDURE — 94760 N-INVAS EAR/PLS OXIMETRY 1: CPT | Performed by: FAMILY MEDICINE

## 2017-12-01 PROCEDURE — 99214 OFFICE O/P EST MOD 30 MIN: CPT | Mod: 25 | Performed by: FAMILY MEDICINE

## 2017-12-01 RX ORDER — PREDNISONE 20 MG/1
40 TABLET ORAL EVERY MORNING
Qty: 12 TAB | Refills: 0 | Status: SHIPPED | OUTPATIENT
Start: 2017-12-01 | End: 2017-12-07

## 2017-12-01 RX ORDER — IPRATROPIUM BROMIDE AND ALBUTEROL SULFATE 2.5; .5 MG/3ML; MG/3ML
3 SOLUTION RESPIRATORY (INHALATION) ONCE
Status: COMPLETED | OUTPATIENT
Start: 2017-12-01 | End: 2017-12-01

## 2017-12-01 RX ORDER — CEFDINIR 300 MG/1
300 CAPSULE ORAL EVERY 12 HOURS
Qty: 14 CAP | Refills: 0 | Status: SHIPPED | OUTPATIENT
Start: 2017-12-01 | End: 2017-12-08

## 2017-12-01 RX ORDER — CODEINE PHOSPHATE AND GUAIFENESIN 10; 100 MG/5ML; MG/5ML
10 SOLUTION ORAL EVERY 6 HOURS PRN
Qty: 240 ML | Refills: 0 | Status: SHIPPED | OUTPATIENT
Start: 2017-12-01 | End: 2018-01-29

## 2017-12-01 RX ORDER — ALBUTEROL SULFATE 90 UG/1
2 AEROSOL, METERED RESPIRATORY (INHALATION) EVERY 6 HOURS PRN
Qty: 8.5 G | Refills: 3 | Status: SHIPPED | OUTPATIENT
Start: 2017-12-01 | End: 2018-01-29

## 2017-12-01 RX ADMIN — IPRATROPIUM BROMIDE AND ALBUTEROL SULFATE 3 ML: 2.5; .5 SOLUTION RESPIRATORY (INHALATION) at 13:33

## 2017-12-01 ASSESSMENT — ENCOUNTER SYMPTOMS
FEVER: 0
ORTHOPNEA: 0
SPUTUM PRODUCTION: 0
SORE THROAT: 1
WHEEZING: 1
COUGH: 1
DIZZINESS: 0
FOCAL WEAKNESS: 0
CHILLS: 0

## 2017-12-01 NOTE — LETTER
December 1, 2017         Patient: Kaia Sorto   YOB: 1945   Date of Visit: 12/1/2017           To Whom it May Concern:    Kaia Sorto was seen in my clinic on 12/1/2017. She may return to work in 3 days.    If you have any questions or concerns, please don't hesitate to call.        Sincerely,           Gm Escudero M.D.  Electronically Signed

## 2017-12-01 NOTE — PROGRESS NOTES
Subjective:      Kaia Sorto is a 72 y.o. female who presents with Cough (cough/ chest congestion)    Chief Complaint   Patient presents with   • Cough     cough/ chest congestion        - This is a very pleasant 72 y.o. female with complaints of 1-2wks w/ cough and sinus congestion. No NVFC/cp/sob     - Hx HTN, stable on meds           ALLERGIES:  Morphine     PMH:  Past Medical History:   Diagnosis Date   • Arthritis     wrists   • Chickenpox    • Depression    • Disorder of thyroid    • Hypertension    • Hypothyroidism    • Influenza    • Mumps    • Sleep apnea    • Snoring    • Tonsillitis         MEDS:    Current Outpatient Prescriptions:   •  cefdinir (OMNICEF) 300 MG Cap, Take 1 Cap by mouth every 12 hours for 7 days., Disp: 14 Cap, Rfl: 0  •  predniSONE (DELTASONE) 20 MG Tab, Take 2 Tabs by mouth every morning for 6 days., Disp: 12 Tab, Rfl: 0  •  albuterol (PROAIR HFA) 108 (90 Base) MCG/ACT Aero Soln inhalation aerosol, Inhale 2 Puffs by mouth every 6 hours as needed for Shortness of Breath., Disp: 8.5 g, Rfl: 3  •  melatonin 3 MG Tab, Take 3 mg by mouth every bedtime., Disp: , Rfl:   •  levothyroxine (SYNTHROID) 75 MCG Tab, Take 75 mcg by mouth Every morning on an empty stomach., Disp: , Rfl:   •  valsartan (DIOVAN) 320 MG tablet, Take 320 mg by mouth every day., Disp: , Rfl:   •  hydrochlorothiazide (HYDRODIURIL) 25 MG TABS, Take 25 mg by mouth every day., Disp: , Rfl:     Current Facility-Administered Medications:   •  ipratropium-albuterol (DUONEB) nebulizer solution 3 mL, 3 mL, Nebulization, Once, Gm Escudero M.D.    ** I have documented what I find to be significant in regards to past medical, social, family and surgical history  in my HPI or under PMH/PSH/FH review section, otherwise it is contributory **           HPI    Review of Systems   Constitutional: Negative for chills and fever.   HENT: Positive for congestion and sore throat.    Respiratory: Positive for cough and wheezing.  "Negative for sputum production.    Cardiovascular: Negative for chest pain and orthopnea.   Neurological: Negative for dizziness and focal weakness.          Objective:     /60   Pulse 80   Temp 36.2 °C (97.2 °F)   Resp 16   Ht 1.651 m (5' 5\")   Wt 111.6 kg (246 lb)   SpO2 91%   BMI 40.94 kg/m²      Physical Exam   Constitutional: She appears well-developed. No distress.   HENT:   Head: Normocephalic and atraumatic.   Mouth/Throat: Oropharynx is clear and moist.   Eyes: Conjunctivae are normal.   Neck: Neck supple.   Cardiovascular: Regular rhythm.    No murmur heard.  Pulmonary/Chest: Effort normal. No respiratory distress. She has wheezes.   Lymphadenopathy:     She has cervical adenopathy ( Rt submandibular ).   Neurological: She is alert. She exhibits normal muscle tone.   Skin: Skin is warm and dry.   Psychiatric: She has a normal mood and affect. Judgment normal.   Nursing note and vitals reviewed.              Assessment/Plan:         1. RTI (respiratory tract infection)  ipratropium-albuterol (DUONEB) nebulizer solution 3 mL   2. Acute wheezy bronchitis  cefdinir (OMNICEF) 300 MG Cap    predniSONE (DELTASONE) 20 MG Tab    albuterol (PROAIR HFA) 108 (90 Base) MCG/ACT Aero Soln inhalation aerosol   3. Lymphadenitis  cefdinir (OMNICEF) 300 MG Cap             Dx & d/c instructions discussed w/ patient and/or family members. Follow up w/ Prvt Dr or here in 3-4 days if not getting better, sooner if needed,  ER if worse and UC/PCP unavailable.        Possible side effects (i.e. Rash, GI upset/constipation, sedation, elevation of BP or sugars) of any medications given discussed.                "

## 2018-01-11 ENCOUNTER — HOME STUDY (OUTPATIENT)
Dept: SLEEP MEDICINE | Facility: MEDICAL CENTER | Age: 73
End: 2018-01-11
Attending: NURSE PRACTITIONER
Payer: MEDICARE

## 2018-01-11 ENCOUNTER — APPOINTMENT (OUTPATIENT)
Dept: PULMONOLOGY | Facility: HOSPICE | Age: 73
End: 2018-01-11
Attending: NURSE PRACTITIONER
Payer: MEDICARE

## 2018-01-11 DIAGNOSIS — G47.33 OBSTRUCTIVE SLEEP APNEA: ICD-10-CM

## 2018-01-11 PROCEDURE — 94762 N-INVAS EAR/PLS OXIMTRY CONT: CPT | Performed by: FAMILY MEDICINE

## 2018-01-12 NOTE — PROCEDURES
Over Night Pulse Oximetry     Indication:To assess the efficacy of the recommended pressure      Impression:   The study was done on CPAP of 5-15 cm. The total recording time was 5 hrs 10 min. O2 Sat. anupam was 81% and mean O2 sat was 87 % and baseline O2 at 91%. O2 sat was below 88% for 2.5 min of the flow evaluation time. Oxygen Desaturation (>=4%) Index was elevated at 3.5/hr.      Recommendation:  The O2 anupam of 81% seems an artifact. Her average O2 saturation was in high 80's. Recommend continuing on current CPAP pressures. Clinical correlation recommended.

## 2018-01-29 ENCOUNTER — SLEEP CENTER VISIT (OUTPATIENT)
Dept: SLEEP MEDICINE | Facility: MEDICAL CENTER | Age: 73
End: 2018-01-29
Payer: MEDICARE

## 2018-01-29 VITALS
DIASTOLIC BLOOD PRESSURE: 98 MMHG | HEIGHT: 65 IN | HEART RATE: 68 BPM | OXYGEN SATURATION: 95 % | RESPIRATION RATE: 16 BRPM | BODY MASS INDEX: 40.82 KG/M2 | SYSTOLIC BLOOD PRESSURE: 142 MMHG | WEIGHT: 245 LBS

## 2018-01-29 DIAGNOSIS — I10 ESSENTIAL HYPERTENSION: ICD-10-CM

## 2018-01-29 DIAGNOSIS — G47.33 OBSTRUCTIVE SLEEP APNEA: ICD-10-CM

## 2018-01-29 PROCEDURE — 99214 OFFICE O/P EST MOD 30 MIN: CPT | Performed by: NURSE PRACTITIONER

## 2018-01-29 RX ORDER — BENZONATATE 200 MG/1
CAPSULE ORAL
Refills: 0 | COMMUNITY
Start: 2017-11-26 | End: 2018-01-29

## 2018-01-29 NOTE — PROGRESS NOTES
No chief complaint on file.      HPI:  Kaia Sorto is a 72 y.o. year old female here today for follow-up on TALIA. Last office visit was 11/16/2017. She did undergone previous home sleep testing and additional appliances fabricated but she did not accommodate to use of the device and found it intolerable. She did have oral surgery for snoring/sleep apnea 20 years ago as well. Home sleep study 8/10/2017 indicated moderate objective sleep apnea with an AHI of 20.2 and a anupam of 73%. She is started on auto CPAP 5-15 cm H2O. Compliance card 12/30/2017 through 1/28/2018 indicates 93.3% compliance, average nightly usage of 5 hours 4 minutes, 3 minutes of average nightly leaking and an overall AHI of 3.1. CNOX on this setting indicated adequate oxygenation as well. Today patient complains of headgear slipping up during the night and leaking which wakes her almost 3 times nightly. She notes occasional morning headaches and continues to have some fatigue during the day but feels her energy levels are improved since starting therapy. She tolerates pressure well. She denies any changes in health since her last office visit. She denies cardiorespiratory symptoms.           ROS: As per HPI and otherwise negative if not stated.    Past Medical History:   Diagnosis Date   • Arthritis     wrists   • Chickenpox    • Depression    • Disorder of thyroid    • Hypertension    • Hypothyroidism    • Influenza    • Mumps    • Sleep apnea    • Snoring    • Tonsillitis        Past Surgical History:   Procedure Laterality Date   • CARPAL TUNNEL ENDOSCOPIC Right 11/3/2016    Procedure: ENDOSCOPIC CARPAL TUNNEL RELEASE;  Surgeon: Honorio Pritchard M.D.;  Location: SURGERY [facility];  Service:    • OTHER ABDOMINAL SURGERY  2006    cholecystectomy   • OTHER ORTHOPEDIC SURGERY Left 2005    rotator cuff repair   • OTHER ORTHOPEDIC SURGERY Right 1978    rt shoulder dislocation repair   • CARPAL TUNNEL RELEASE     • CHOLECYSTECTOMY     •  "OTHER      bilateral cataracts removed       Family History   Problem Relation Age of Onset   • Heart Failure Mother    • Heart Disease Father    • Sleep Apnea Neg Hx        Social History     Social History   • Marital status: Single     Spouse name: N/A   • Number of children: N/A   • Years of education: N/A     Occupational History   • Not on file.     Social History Main Topics   • Smoking status: Former Smoker     Packs/day: 0.25     Years: 10.00     Types: Cigarettes     Quit date: 1/1/1981   • Smokeless tobacco: Never Used   • Alcohol use 0.0 - 0.6 oz/week      Comment: Occ   • Drug use: No   • Sexual activity: Not on file     Other Topics Concern   • Not on file     Social History Narrative   • No narrative on file       Allergies as of 01/29/2018 - Reviewed 01/29/2018   Allergen Reaction Noted   • Morphine Vomiting 12/03/2011        @Vital signs for this encounter:  Vitals:    01/29/18 0940   Height: 1.651 m (5' 5\")   Weight: 111.1 kg (245 lb)   Weight % change since last entry.: 0 %   BP: 142/98   Pulse: 68   BMI (Calculated): 40.77   Resp: 16   O2 sat % room air: 95 %       Current medications as of today   Current Outpatient Prescriptions   Medication Sig Dispense Refill   • melatonin 3 MG Tab Take 3 mg by mouth every bedtime.     • levothyroxine (SYNTHROID) 75 MCG Tab Take 75 mcg by mouth Every morning on an empty stomach.     • valsartan (DIOVAN) 320 MG tablet Take 320 mg by mouth every day.     • hydrochlorothiazide (HYDRODIURIL) 25 MG TABS Take 25 mg by mouth every day.       No current facility-administered medications for this visit.          Physical Exam:   Gen:           Alert and oriented, No apparent distress. Mood and affect appropriate, normal interaction with examiner.  Eyes:          PERRL, EOM intact, sclere white, conjunctive moist.  Ears:          Not examined.   Hearing:     Grossly intact.  Nose:          Normal, no lesions or deformities.  Dentition:    Good dentition.  Oropharynx: "   Tongue normal, posterior pharynx without erythema or exudate.  Mallampati Classification: not examined.  Neck:        Supple, trachea midline, no masses.  Respiratory Effort: No intercostal retractions or use of accessory muscles.   Lung Auscultation:      Clear to auscultation bilaterally; no rales, rhonchi or wheezing.  CV:            Regular rate and rhythm. No murmurs, rubs or gallops.  Abd:           Not examined.   Lymphadenopathy: Not examined  Gait and Station: Normal.  Digits and Nails: No clubbing, cyanosis, petechiae, or nodes.   Cranial Nerves: II-XII grossly intact.  Skin:        No rashes, lesions or ulcers noted.               Ext:           No cyanosis or edema.      Assessment:  1. Obstructive sleep apnea  MASK FITTING   2. Adult BMI 40.0-44.9 kg/sq m (CMS-[facility])  HEIGHT AND WEIGHT   3. Essential hypertension         Immunizations:    Flu:9/18/17  Pneumovax 23:not given  Prevnar 13:11/2016    Plan:  1. Continue CPAP 5-15 cm H2O nightly.  2. Discussed sleep hygiene.  3. Encourage weight loss.  4. Mask fit and office today.  5. Follow-up in 6 months with compliance card, sooner if needed.

## 2018-02-07 ENCOUNTER — TELEPHONE (OUTPATIENT)
Dept: SLEEP MEDICINE | Facility: MEDICAL CENTER | Age: 73
End: 2018-02-07

## 2018-02-07 DIAGNOSIS — G47.31 CENTRAL SLEEP APNEA: Primary | ICD-10-CM

## 2018-02-07 NOTE — TELEPHONE ENCOUNTER
Kaia called this morning stating her hose is not letting her sleep because it is leaking throughout the night, she contacted [employer] and they told her to get an order for a heated hose and they can take care of replacing it for her.   Please sign.

## 2018-07-20 ENCOUNTER — HOSPITAL ENCOUNTER (OUTPATIENT)
Dept: LAB | Facility: MEDICAL CENTER | Age: 73
End: 2018-07-20
Attending: FAMILY MEDICINE
Payer: MEDICARE

## 2018-07-20 LAB — TSH SERPL DL<=0.005 MIU/L-ACNC: 1.75 UIU/ML (ref 0.38–5.33)

## 2018-07-20 PROCEDURE — 84443 ASSAY THYROID STIM HORMONE: CPT

## 2018-07-20 PROCEDURE — 36415 COLL VENOUS BLD VENIPUNCTURE: CPT

## 2018-07-30 ENCOUNTER — APPOINTMENT (OUTPATIENT)
Dept: SLEEP MEDICINE | Facility: MEDICAL CENTER | Age: 73
End: 2018-07-30
Payer: MEDICARE

## 2018-08-27 ENCOUNTER — HOSPITAL ENCOUNTER (OUTPATIENT)
Dept: RADIOLOGY | Facility: MEDICAL CENTER | Age: 73
End: 2018-08-27
Attending: ORTHOPAEDIC SURGERY
Payer: MEDICARE

## 2018-08-27 DIAGNOSIS — M25.512 LEFT SHOULDER PAIN, UNSPECIFIED CHRONICITY: ICD-10-CM

## 2018-08-27 PROCEDURE — 73221 MRI JOINT UPR EXTREM W/O DYE: CPT | Mod: LT

## 2018-09-07 ENCOUNTER — HOSPITAL ENCOUNTER (OUTPATIENT)
Dept: RADIOLOGY | Facility: MEDICAL CENTER | Age: 73
End: 2018-09-07
Attending: ORTHOPAEDIC SURGERY
Payer: MEDICARE

## 2018-09-07 DIAGNOSIS — M25.511 RIGHT SHOULDER PAIN, UNSPECIFIED CHRONICITY: ICD-10-CM

## 2018-09-07 PROCEDURE — 73221 MRI JOINT UPR EXTREM W/O DYE: CPT | Mod: RT

## 2018-09-21 ENCOUNTER — SLEEP CENTER VISIT (OUTPATIENT)
Dept: SLEEP MEDICINE | Facility: MEDICAL CENTER | Age: 73
End: 2018-09-21
Payer: MEDICARE

## 2018-09-21 VITALS
OXYGEN SATURATION: 94 % | BODY MASS INDEX: 40.34 KG/M2 | RESPIRATION RATE: 16 BRPM | DIASTOLIC BLOOD PRESSURE: 66 MMHG | HEIGHT: 66 IN | SYSTOLIC BLOOD PRESSURE: 128 MMHG | HEART RATE: 64 BPM | WEIGHT: 251 LBS

## 2018-09-21 DIAGNOSIS — Z87.891 HISTORY OF TOBACCO ABUSE: ICD-10-CM

## 2018-09-21 DIAGNOSIS — Z23 NEED FOR INFLUENZA VACCINATION: ICD-10-CM

## 2018-09-21 DIAGNOSIS — I10 ESSENTIAL HYPERTENSION: ICD-10-CM

## 2018-09-21 DIAGNOSIS — E03.9 HYPOTHYROIDISM, UNSPECIFIED TYPE: ICD-10-CM

## 2018-09-21 DIAGNOSIS — G47.33 OBSTRUCTIVE SLEEP APNEA: ICD-10-CM

## 2018-09-21 DIAGNOSIS — E66.01 CLASS 3 SEVERE OBESITY DUE TO EXCESS CALORIES WITHOUT SERIOUS COMORBIDITY WITH BODY MASS INDEX (BMI) OF 40.0 TO 44.9 IN ADULT (HCC): ICD-10-CM

## 2018-09-21 PROBLEM — E66.813 CLASS 3 SEVERE OBESITY DUE TO EXCESS CALORIES WITH BODY MASS INDEX (BMI) OF 40.0 TO 44.9 IN ADULT (HCC): Status: ACTIVE | Noted: 2018-09-21

## 2018-09-21 PROCEDURE — 99214 OFFICE O/P EST MOD 30 MIN: CPT | Performed by: INTERNAL MEDICINE

## 2018-09-21 NOTE — PROGRESS NOTES
CC: Follow up for sleep disturbance    HPI:  Ms. Kaia Sorto is a 74 y/o F who was last seen 1/29/18. Home sleep study 8/10/2017 indicated moderate objective sleep apnea with an AHI of 20.2 and a anupam of 73%. She is started on auto CPAP 5-15 cm H2O. Compliance card 12/30/2017 through 1/28/2018 indicated 93.3% compliance, average nightly usage of 5 hours 4 minutes, 3 minutes of average nightly leaking and an overall AHI of 3.1. CNOX on this setting indicated adequate oxygenation as well.       Today, 9/21/2018, her 30 day compliance is only 10% for 4 hours and 7 minutes on days used.  She has a residual apnea hypopnea index of 3.1 and she has no significant leak.  She was encouraged to use her mask more consistently for longer periods of time. The problem was that her machine broke and she just got a new one 4 days ago.    Significant comorbidities and modifying factors include obesity, hypertension, hypothyroidism, and history of tobacco abuse.      Patient Active Problem List    Diagnosis Date Noted   • Class 3 severe obesity due to excess calories with body mass index (BMI) of 40.0 to 44.9 in adult ([facility]) 09/21/2018   • History of tobacco abuse 09/21/2018   • Need for influenza vaccination 09/21/2018   • Obstructive sleep apnea 08/16/2017   • Essential hypertension 08/16/2017   • Hypothyroidism 08/16/2017   • Adult BMI 40.0-44.9 kg/sq m ([facility]) 08/16/2017   • Carpal tunnel syndrome 11/03/2016       Past Medical History:   Diagnosis Date   • Arthritis     wrists   • Chickenpox    • Depression    • Disorder of thyroid    • Hypertension    • Hypothyroidism    • Influenza    • Mumps    • Sleep apnea    • Snoring    • Tonsillitis         Past Surgical History:   Procedure Laterality Date   • CARPAL TUNNEL ENDOSCOPIC Right 11/3/2016    Procedure: ENDOSCOPIC CARPAL TUNNEL RELEASE;  Surgeon: Honorio Pritchard M.D.;  Location: SURGERY [facility];  Service:    • OTHER ABDOMINAL SURGERY  2006    cholecystectomy   •  "OTHER ORTHOPEDIC SURGERY Left 2005    rotator cuff repair   • OTHER ORTHOPEDIC SURGERY Right 1978    rt shoulder dislocation repair   • CARPAL TUNNEL RELEASE     • CHOLECYSTECTOMY     • OTHER      bilateral cataracts removed       Family History   Problem Relation Age of Onset   • Heart Failure Mother    • Heart Disease Father    • Sleep Apnea Neg Hx        Social History     Social History   • Marital status: Single     Spouse name: N/A   • Number of children: N/A   • Years of education: N/A     Occupational History   • Not on file.     Social History Main Topics   • Smoking status: Former Smoker     Packs/day: 0.25     Years: 10.00     Types: Cigarettes     Quit date: 1/1/1981   • Smokeless tobacco: Never Used   • Alcohol use 0.0 - 0.6 oz/week      Comment: Occ   • Drug use: No   • Sexual activity: Not on file     Other Topics Concern   • Not on file     Social History Narrative   • No narrative on file       Current Outpatient Prescriptions   Medication Sig Dispense Refill   • melatonin 3 MG Tab Take 3 mg by mouth every bedtime.     • levothyroxine (SYNTHROID) 75 MCG Tab Take 75 mcg by mouth Every morning on an empty stomach.     • valsartan (DIOVAN) 320 MG tablet Take 320 mg by mouth every day.     • hydrochlorothiazide (HYDRODIURIL) 25 MG TABS Take 25 mg by mouth every day.       No current facility-administered medications for this visit.     \"CURRENT RX\"    ALLERGIES: Morphine    ROS    Constitutional: Denies fever, chills, sweats,  weight loss, fatigue  Cardiovascular: Denies chest pain, tightness, palpitations, swelling in legs/feet, fainting, difficulty breathing when lying down but gets better when sitting up.   Respiratory: Denies shortness of breath, cough, sputum, wheezing, painful breathing, coughing up blood.   Sleep: per HPI  Gastrointestinal: Denies  difficulty swallowing, nausea, abdominal pain, diarrhea, constipation, heartburn..   Musculoskeletal: Denies painful joints, sore muscles, back pain.  " "      PHYSICAL EXAM  MSEW    /66 (BP Location: Right arm, Patient Position: Sitting, BP Cuff Size: Large adult)   Pulse 64   Resp 16   Ht 1.676 m (5' 6\")   Wt 113.9 kg (251 lb)   SpO2 94%   BMI 40.51 kg/m²   Appearance: Well-nourished, well-developed, no acute distress  Eyes:  PERRLA, EOMI  ENMT: without lesions, deformities;hearing grossly intact; tongue normal, posterior pharynx without erythema or exudate  Neck: Supple, trachea midline, no masses  Respiratory effort:  No intercostal retractions or use of accessory muscles  Lung auscultation:  No wheezes rhonchi rubs or rales  Cardiac: No murmurs, rubs, or gallops; regular rhythm, normal rate; no edema  Abdomen:  No tenderness, no organomegaly  Musculoskeletal:  Grossly normal; gait and station normal; digits and nails normal  Skin:  No rashes, petechiae, cyanosis  Neurologic: without focal signs; oriented to person, time, place, and purpose; judgement intact  Psychiatric:  No depression, anxiety, agitation        PROBLEMS:    1. Obstructive sleep apnea      2. Class 3 severe obesity due to excess calories without serious comorbidity with body mass index (BMI) of 40.0 to 44.9 in adult ([facility])    - OBESITY COUNSELING (No Charge): Patient identified as having weight management issue.  Appropriate orders and counseling given.    3. History of tobacco abuse      4. Need for influenza vaccination    - Influenza Vaccine, High Dose (65+ Only)    5. Hypothyroidism, unspecified type      6. Essential hypertension          PLAN:   Return in about 6 months (around 3/21/2019).    Today, 9/21/2018, her 30 day compliance is only 10% for 4 hours and 7 minutes on days used.  She has a residual apnea hypopnea index of 3.1 and she has no significant leak.She is using nasal pillows.      Has been advised to continue the current auto titrating CPAP 5-15 cm H2O, clean equipment frequently, and get new mask and supplies as allowed by insurance and DME. Advised about potential " problems including nasal obstruction/stuffiness, mask leak or discomfort , frequent awakenings, chill or dryness of the upper airway, noise, inconvenience, and lack of benefit. Recommend an earlier appointment, if significant treatment barriers develop.

## 2018-10-22 ENCOUNTER — OFFICE VISIT (OUTPATIENT)
Dept: URGENT CARE | Facility: CLINIC | Age: 73
End: 2018-10-22
Payer: MEDICARE

## 2018-10-22 VITALS
RESPIRATION RATE: 16 BRPM | WEIGHT: 151.4 LBS | HEART RATE: 84 BPM | TEMPERATURE: 98.5 F | DIASTOLIC BLOOD PRESSURE: 82 MMHG | SYSTOLIC BLOOD PRESSURE: 130 MMHG | HEIGHT: 66 IN | OXYGEN SATURATION: 99 % | BODY MASS INDEX: 24.33 KG/M2

## 2018-10-22 DIAGNOSIS — J98.8 RTI (RESPIRATORY TRACT INFECTION): ICD-10-CM

## 2018-10-22 PROCEDURE — 99214 OFFICE O/P EST MOD 30 MIN: CPT | Performed by: FAMILY MEDICINE

## 2018-10-22 RX ORDER — AMOXICILLIN AND CLAVULANATE POTASSIUM 875; 125 MG/1; MG/1
1 TABLET, FILM COATED ORAL 2 TIMES DAILY
Qty: 14 TAB | Refills: 0 | Status: SHIPPED | OUTPATIENT
Start: 2018-10-22 | End: 2018-10-29

## 2018-10-22 RX ORDER — BENZONATATE 100 MG/1
200 CAPSULE ORAL 3 TIMES DAILY PRN
Qty: 30 CAP | Refills: 1 | Status: SHIPPED | OUTPATIENT
Start: 2018-10-22 | End: 2019-03-09

## 2018-10-22 ASSESSMENT — ENCOUNTER SYMPTOMS
DIZZINESS: 0
SPUTUM PRODUCTION: 0
CHILLS: 0
COUGH: 1
FEVER: 0

## 2018-10-22 NOTE — PROGRESS NOTES
"Subjective:      Kaia Sorto is a 73 y.o. female who presents with Cough (bronchitis, began on thursday )      - This is a very pleasant, well and non-toxic appearing 73 y.o. female with complaints of 5 days w/ cough and a little sinus congestion, no NVFC/cp/sob           ALLERGIES:  Morphine     PMH:  Past Medical History:   Diagnosis Date   • Arthritis     wrists   • Chickenpox    • Depression    • Disorder of thyroid    • Hypertension    • Hypothyroidism    • Influenza    • Mumps    • Sleep apnea    • Snoring    • Tonsillitis         MEDS:    Current Outpatient Prescriptions:   •  melatonin 3 MG Tab, Take 3 mg by mouth every bedtime., Disp: , Rfl:   •  levothyroxine (SYNTHROID) 75 MCG Tab, Take 75 mcg by mouth Every morning on an empty stomach., Disp: , Rfl:   •  valsartan (DIOVAN) 320 MG tablet, Take 320 mg by mouth every day., Disp: , Rfl:   •  hydrochlorothiazide (HYDRODIURIL) 25 MG TABS, Take 25 mg by mouth every day., Disp: , Rfl:     ** I have documented what I find to be significant in regards to past medical, social, family and surgical history  in my HPI or under PMH/PSH/FH review section, otherwise it is contributory **           HPI    Review of Systems   Constitutional: Negative for chills and fever.   HENT: Positive for congestion.    Respiratory: Positive for cough. Negative for sputum production.    Neurological: Negative for dizziness.   All other systems reviewed and are negative.         Objective:     /82   Pulse 84   Temp 36.9 °C (98.5 °F)   Resp 16   Ht 1.676 m (5' 6\")   Wt 68.7 kg (151 lb 6.4 oz)   SpO2 99%   BMI 24.44 kg/m²      Physical Exam   Constitutional: She appears well-developed. No distress.   HENT:   Head: Normocephalic and atraumatic.   Mouth/Throat: Oropharynx is clear and moist.   Eyes: Conjunctivae are normal.   Neck: Neck supple.   Cardiovascular: Regular rhythm.    No murmur heard.  Pulmonary/Chest: Effort normal and breath sounds normal. No respiratory " distress.   Neurological: She is alert. She exhibits normal muscle tone.   Skin: Skin is warm and dry.   Psychiatric: She has a normal mood and affect. Judgment normal.   Nursing note and vitals reviewed.              Assessment/Plan:         1. RTI (respiratory tract infection)               Dx & d/c instructions discussed w/ patient and/or family members.     ER precautions (worsening signs symptoms and when to go to ER) discussed.    Follow up w/ PCP in 2-3 days to make sure symptoms improving and no further intervention/treatment and/or work-up needed was advised, ER if feeling worse or not improving in 2 days.    Possible side effects (i.e. Rash, GI upset/constipation, sedation, elevation of BP or sugars) of any medications given discussed.     Patient left in stable condition

## 2019-02-06 ENCOUNTER — TELEPHONE (OUTPATIENT)
Dept: PULMONOLOGY | Facility: HOSPICE | Age: 74
End: 2019-02-06

## 2019-02-06 DIAGNOSIS — G47.33 OSA (OBSTRUCTIVE SLEEP APNEA): ICD-10-CM

## 2019-02-06 NOTE — TELEPHONE ENCOUNTER
Patient traveling to [place] and needs order for [employer] C-PAP MACHINE. She renting for travel only. Once order is signed please call patient [phone] and leave message so patient can come and .

## 2019-03-05 ENCOUNTER — TELEPHONE (OUTPATIENT)
Dept: PULMONOLOGY | Facility: HOSPICE | Age: 74
End: 2019-03-05

## 2019-03-05 NOTE — TELEPHONE ENCOUNTER
Please sign for PAP supplies    Faxed order, notes and SS to [employer] with [employer]    May need to wait for 3/25/19 OV as this OV does not document good compliance

## 2019-03-07 NOTE — TELEPHONE ENCOUNTER
Patient will need to wait for OV to have compliance documented as last OV documents poor compliance    This is a requirement by [employer] and [employer] is very strict in following this

## 2019-03-08 ENCOUNTER — SLEEP CENTER VISIT (OUTPATIENT)
Dept: SLEEP MEDICINE | Facility: MEDICAL CENTER | Age: 74
End: 2019-03-08
Payer: MEDICARE

## 2019-03-08 VITALS
RESPIRATION RATE: 16 BRPM | BODY MASS INDEX: 40.5 KG/M2 | SYSTOLIC BLOOD PRESSURE: 128 MMHG | OXYGEN SATURATION: 96 % | HEART RATE: 64 BPM | WEIGHT: 252 LBS | HEIGHT: 66 IN | DIASTOLIC BLOOD PRESSURE: 86 MMHG

## 2019-03-08 DIAGNOSIS — G47.33 OBSTRUCTIVE SLEEP APNEA SYNDROME: ICD-10-CM

## 2019-03-08 DIAGNOSIS — R06.83 SNORING: ICD-10-CM

## 2019-03-08 DIAGNOSIS — G47.10 HYPERSOMNOLENCE: ICD-10-CM

## 2019-03-08 DIAGNOSIS — I10 ESSENTIAL HYPERTENSION: ICD-10-CM

## 2019-03-08 DIAGNOSIS — Z87.891 FORMER SMOKER: ICD-10-CM

## 2019-03-08 PROCEDURE — 99214 OFFICE O/P EST MOD 30 MIN: CPT | Performed by: INTERNAL MEDICINE

## 2019-03-08 NOTE — PROGRESS NOTES
CC: Sleep apnea hypopnea syndrome    HPI:   She is a long history of snoring and daytime somnolence.  About 2 years ago she underwent a home sleep test through her dentist's office.  A dental appliance was fabricated but she did not accommodate to the use of that device feeling that it caused intolerable oral discomfort. A home sleep test on October 10, 2017 demonstrated a respiratory disturbance index of 20 events per hour with significant hypoxemia.  She was started on auto titrating CPAP at 5-15 cm of water pressure.  She had trouble acclimating to the mask and a nocturnal oximetry study on January 11, 2018 demonstrated a lowest saturation of 81% on room air but she spent less than 3 minutes with a saturation less than 90% on treatment.    She now has a comfortable well fitted mask and is using the machine regularly each night.  She is not having usual problems with mask fit, leakage or airway dryness.  She enjoys reasonable levels of daytime alertness.    The CPAP data recording chip information is reviewed with the patient.  It demonstrates use in each of the last 30 days with an usage of 5 hours and 33 minutes.  Leak is minimal with a mean CPAP pressure of 7.6 cm of water and 90% maximum of 9.3.  The apnea hypopnea index is 3.1 events per hour.    She does have a history of systemic arterial hypertension treated with Diovan and hydrochlorothiazide.  She is on thyroid hormone replacement therapy and she has a history of painful heel spurs treated with nabumetone and that pain may interfere with her sleep        Patient Active Problem List    Diagnosis Date Noted   • Class 3 severe obesity due to excess calories with body mass index (BMI) of 40.0 to 44.9 in adult ([facility]) 09/21/2018   • History of tobacco abuse 09/21/2018   • Need for influenza vaccination 09/21/2018   • Obstructive sleep apnea 08/16/2017   • Essential hypertension 08/16/2017   • Hypothyroidism 08/16/2017   • Adult BMI 40.0-44.9 kg/sq m (HCC)  08/16/2017   • Carpal tunnel syndrome 11/03/2016       Past Medical History:   Diagnosis Date   • Arthritis     wrists   • Chickenpox    • Depression    • Disorder of thyroid    • Hypertension    • Hypothyroidism    • Influenza    • Mumps    • Sleep apnea    • Snoring    • Tonsillitis        Past Surgical History:   Procedure Laterality Date   • CARPAL TUNNEL ENDOSCOPIC Right 11/3/2016    Procedure: ENDOSCOPIC CARPAL TUNNEL RELEASE;  Surgeon: Honorio Pritchard M.D.;  Location: SURGERY [facility];  Service:    • OTHER ABDOMINAL SURGERY  2006    cholecystectomy   • OTHER ORTHOPEDIC SURGERY Left 2005    rotator cuff repair   • OTHER ORTHOPEDIC SURGERY Right 1978    rt shoulder dislocation repair   • CARPAL TUNNEL RELEASE     • CHOLECYSTECTOMY     • OTHER      bilateral cataracts removed       Family History   Problem Relation Age of Onset   • Heart Failure Mother    • Heart Disease Father    • Sleep Apnea Neg Hx        Social History     Social History   • Marital status: Single     Spouse name: N/A   • Number of children: N/A   • Years of education: N/A     Occupational History   • Not on file.     Social History Main Topics   • Smoking status: Former Smoker     Packs/day: 0.25     Years: 10.00     Types: Cigarettes     Quit date: 1/1/1981   • Smokeless tobacco: Never Used   • Alcohol use 0.0 - 0.6 oz/week      Comment: Occ   • Drug use: No   • Sexual activity: Not on file     Other Topics Concern   • Not on file     Social History Narrative   • No narrative on file       Current Outpatient Prescriptions   Medication Sig Dispense Refill   • OLMESARTAN-AMLODIPINE-HCTZ PO Take  by mouth.     • levothyroxine (SYNTHROID) 75 MCG Tab Take 75 mcg by mouth Every morning on an empty stomach.     • benzonatate (TESSALON) 100 MG Cap Take 2 Caps by mouth 3 times a day as needed for Cough. (Patient not taking: Reported on 3/8/2019) 30 Cap 1   • melatonin 3 MG Tab Take 3 mg by mouth every bedtime.       No current  "facility-administered medications for this visit.     \"CURRENT RX\"      Allergies: Morphine      ROS  Unchanged from prior.      Physical Exam:   /86 (BP Location: Right arm, Patient Position: Sitting, BP Cuff Size: Adult) Comment (BP Location): wrist  Pulse 64   Resp 16   Ht 1.676 m (5' 6\")   Wt 114.3 kg (252 lb)   SpO2 96%   BMI 40.67 kg/m²    Head and neck examination demonstrates no mucosal lesion, purulent drainage or evident polyps. The pharynx is benign with a Mallampati III presentation. The neck is supple without thyromegaly. On chest examination there are symmetrical bilateral breath sounds without rales, wheezing or consolidation. On cardiac examination, the apical impulse and heart sounds are normal and the rhythm is regular. There is no murmur, gallop or rub and no jugular venous distention. The abdomen is soft with active bowel sounds and no palpable hepatosplenomegaly, mass, guarding or rebound. The extremities show no clubbing, cyanosis or edema and no signs of deep venous thrombosis. There is no warmth, redness, tenderness or palpable venous cord in the calves. The skin is clear, warm and dry. There is no unusual peripheral lymphadenopathy. Peripheral pulses are palpable in all 4 extremities. On neurologic examination, cranial nerve function is intact, motor tone is symmetrical, and the patient is alert, oriented and responsive.       Problems:  1. Obstructive sleep apnea syndrome  She has significant obstructive sleep apnea hypopnea with an apnea hypopnea index of 20 events per hour and associated hypoxemia.  She is doing well on auto titrating CPAP at 5-15 cm of water.  She is using machine regularly, is confirmed by the data recording chip.  She enjoys reasonable levels of daytime alertness and the residual apnea hypopnea index is low at 3.1 events per hour.    2. Hypersomnolence  Improved on CPAP therapy.    3. Snoring  Improved on CPAP therapy.    4. Essential " hypertension  Reasonably well controlled with a pressure of 128/86 today.  She will continue to monitor blood pressure at home.    5. Former smoker    6. BMI 40.0-44.9, adult (HCC)      Plan:   Continue auto titrating CPAP at 5-15 cm of water pressure.  A prescription is written for new mask and supplies as needed.    We have talked about the goal of at least 7.5 hours of treatment on CPAP nightly.    She will continue to follow blood pressure at home.    Return visit here in 6 months, or sooner if new problems develop.  She may drop into the technician clinic at any time for assistance.    We appreciate the opportunity to assist in her care.

## 2019-03-09 ENCOUNTER — OFFICE VISIT (OUTPATIENT)
Dept: URGENT CARE | Facility: CLINIC | Age: 74
End: 2019-03-09
Payer: MEDICARE

## 2019-03-09 VITALS
DIASTOLIC BLOOD PRESSURE: 72 MMHG | SYSTOLIC BLOOD PRESSURE: 120 MMHG | HEART RATE: 66 BPM | RESPIRATION RATE: 14 BRPM | HEIGHT: 66 IN | WEIGHT: 252 LBS | BODY MASS INDEX: 40.5 KG/M2 | TEMPERATURE: 98.6 F | OXYGEN SATURATION: 95 %

## 2019-03-09 DIAGNOSIS — H57.89 IRRITATION OF RIGHT EYE: ICD-10-CM

## 2019-03-09 DIAGNOSIS — M72.2 PLANTAR FASCIITIS: ICD-10-CM

## 2019-03-09 PROCEDURE — 99213 OFFICE O/P EST LOW 20 MIN: CPT | Performed by: NURSE PRACTITIONER

## 2019-03-09 ASSESSMENT — ENCOUNTER SYMPTOMS
NAUSEA: 0
SHORTNESS OF BREATH: 0
BLURRED VISION: 0
EYE DISCHARGE: 0
VOMITING: 0
EYE PAIN: 0
FOREIGN BODY SENSATION: 0
EYE ITCHING: 1
MYALGIAS: 0
PHOTOPHOBIA: 0
CHILLS: 0
FEVER: 0
DOUBLE VISION: 0
EYE REDNESS: 1
SORE THROAT: 0
DIZZINESS: 0

## 2019-03-10 NOTE — PROGRESS NOTES
"Subjective:   Kaia Sorto is a 74 y.o. female who presents for Other (eye infection (R) )        Eye Problem    The right eye is affected. This is a new problem. Episode onset: 2 days. The problem occurs constantly. The problem has been unchanged. Injury mechanism: mascara  The pain is at a severity of 0/10. The patient is experiencing no pain. There is no known exposure to pink eye. She does not wear contacts. Associated symptoms include eye redness and itching. Pertinent negatives include no blurred vision, eye discharge, double vision, fever, foreign body sensation, nausea, photophobia, recent URI or vomiting. She has tried eye drops for the symptoms. The treatment provided mild relief.   Patient also suffers from plantar fasciitis.  She is seen by podiatry and has multiple cortisone shots.she is requesting medication of Voltaren gel to attempt to massage into her feet.  Review of Systems   Constitutional: Negative for chills and fever.   HENT: Negative for sore throat.    Eyes: Positive for redness and itching. Negative for blurred vision, double vision, photophobia, pain and discharge.   Respiratory: Negative for shortness of breath.    Cardiovascular: Negative for chest pain.   Gastrointestinal: Negative for nausea and vomiting.   Genitourinary: Negative for hematuria.   Musculoskeletal: Negative for myalgias.        Bilateral plantar foot pain   Skin: Negative for rash.   Neurological: Negative for dizziness.     Allergies   Allergen Reactions   • Morphine Vomiting      Objective:   /72   Pulse 66   Temp 37 °C (98.6 °F)   Resp 14   Ht 1.676 m (5' 6\")   Wt 114.3 kg (252 lb)   SpO2 95%   BMI 40.67 kg/m²   Physical Exam   Constitutional: She is oriented to person, place, and time. She appears well-developed and well-nourished. No distress.   HENT:   Head: Normocephalic and atraumatic.   Right Ear: Tympanic membrane normal.   Left Ear: Tympanic membrane normal.   Nose: Nose normal. Right sinus " exhibits no maxillary sinus tenderness and no frontal sinus tenderness. Left sinus exhibits no maxillary sinus tenderness and no frontal sinus tenderness.   Mouth/Throat: Uvula is midline, oropharynx is clear and moist and mucous membranes are normal. No posterior oropharyngeal edema, posterior oropharyngeal erythema or tonsillar abscesses. No tonsillar exudate.   Eyes: Pupils are equal, round, and reactive to light. Conjunctivae, EOM and lids are normal. Right eye exhibits no chemosis, no discharge and no exudate. Left eye exhibits no discharge. Right conjunctiva is not injected.       Cardiovascular: Normal rate and regular rhythm.    No murmur heard.  Pulmonary/Chest: Effort normal and breath sounds normal. No respiratory distress.   Abdominal: Soft. She exhibits no distension. There is no tenderness.   Neurological: She is alert and oriented to person, place, and time. She has normal reflexes. No sensory deficit.   Skin: Skin is warm, dry and intact.   Psychiatric: She has a normal mood and affect.         Assessment/Plan:   1. Irritation of right eye    2. Plantar fasciitis  - Diclofenac Sodium 1 % Gel; Apply 1 Application to skin as directed 3 times a day.  Dispense: 1 Tube; Refill: 0  Right eye without drainage, mild edematous no conjunctivitis suspected.  Encouraged to use over-the-counter Zaditor  Patient suffers from plantar fasciitis in which she has been seen by podiatry.  Patient requesting medication of Voltaren gel.  Differential diagnosis, natural history, supportive care, and indications for immediate follow-up discussed.

## 2019-03-14 ENCOUNTER — OFFICE VISIT (OUTPATIENT)
Dept: URGENT CARE | Facility: CLINIC | Age: 74
End: 2019-03-14
Payer: MEDICARE

## 2019-03-14 VITALS
WEIGHT: 252 LBS | SYSTOLIC BLOOD PRESSURE: 138 MMHG | TEMPERATURE: 97.6 F | RESPIRATION RATE: 16 BRPM | OXYGEN SATURATION: 97 % | DIASTOLIC BLOOD PRESSURE: 88 MMHG | HEIGHT: 66 IN | HEART RATE: 78 BPM | BODY MASS INDEX: 40.5 KG/M2

## 2019-03-14 DIAGNOSIS — B37.31 YEAST INFECTION OF THE VAGINA: ICD-10-CM

## 2019-03-14 DIAGNOSIS — J02.9 SORE THROAT: ICD-10-CM

## 2019-03-14 DIAGNOSIS — J01.00 ACUTE MAXILLARY SINUSITIS, RECURRENCE NOT SPECIFIED: ICD-10-CM

## 2019-03-14 LAB
INT CON NEG: NEGATIVE
INT CON POS: POSITIVE
S PYO AG THROAT QL: NEGATIVE

## 2019-03-14 PROCEDURE — 99214 OFFICE O/P EST MOD 30 MIN: CPT | Performed by: NURSE PRACTITIONER

## 2019-03-14 PROCEDURE — 87880 STREP A ASSAY W/OPTIC: CPT | Performed by: NURSE PRACTITIONER

## 2019-03-14 RX ORDER — FLUCONAZOLE 150 MG/1
150 TABLET ORAL ONCE
Qty: 2 TAB | Refills: 0 | Status: SHIPPED | OUTPATIENT
Start: 2019-03-14 | End: 2019-03-14

## 2019-03-14 RX ORDER — FLUTICASONE PROPIONATE 50 MCG
2 SPRAY, SUSPENSION (ML) NASAL DAILY
Qty: 1 BOTTLE | Refills: 3 | Status: SHIPPED | OUTPATIENT
Start: 2019-03-14

## 2019-03-14 RX ORDER — AMOXICILLIN AND CLAVULANATE POTASSIUM 875; 125 MG/1; MG/1
1 TABLET, FILM COATED ORAL 2 TIMES DAILY
Qty: 14 TAB | Refills: 0 | Status: SHIPPED | OUTPATIENT
Start: 2019-03-14 | End: 2019-03-21

## 2019-03-15 ASSESSMENT — ENCOUNTER SYMPTOMS
NAUSEA: 0
HEADACHES: 0
MYALGIAS: 0
DIARRHEA: 0
WEAKNESS: 0
CHILLS: 0
DIZZINESS: 0
VOMITING: 0
FLANK PAIN: 0
CONSTIPATION: 0
ABDOMINAL PAIN: 0
EYE REDNESS: 0
COUGH: 0
EYE DISCHARGE: 0
NECK PAIN: 0
SHORTNESS OF BREATH: 0
FEVER: 0
WHEEZING: 0
SORE THROAT: 1

## 2019-03-15 NOTE — PROGRESS NOTES
"Subjective:      Kaia Sorto is a 74 y.o. female who presents with Pharyngitis (x3 days, neck and throat feels tender, off and on, feels like she's starting a cold x1 day)            HPI  Bilat sore throat pain, lymph node tenderness on outside of neck. Denies difficulty swallowing, sinus facial pressure or ear pain. Sneezing. Mild nasal congestion and PND. No cough. Denies fever or body aches. Leaves for [place] next week.    C/o recurrent \"vaginitis\"-\"itchiness\" to vaginal region. Has been using OTC Monistat with no help. Denies h/o diabetes. No estrogen cream used.     PMH:  has a past medical history of Arthritis; Chickenpox; Depression; Disorder of thyroid; Hypertension; Hypothyroidism; Influenza; Mumps; Sleep apnea; Snoring; and Tonsillitis.  MEDS:   Current Outpatient Prescriptions:   •  amoxicillin-clavulanate (AUGMENTIN) 875-125 MG Tab, Take 1 Tab by mouth 2 times a day for 7 days., Disp: 14 Tab, Rfl: 0  •  fluticasone (FLONASE) 50 MCG/ACT nasal spray, Spray 2 Sprays in nose every day., Disp: 1 Bottle, Rfl: 3  •  OLMESARTAN-AMLODIPINE-HCTZ PO, Take  by mouth., Disp: , Rfl:   •  levothyroxine (SYNTHROID) 75 MCG Tab, Take 75 mcg by mouth Every morning on an empty stomach., Disp: , Rfl:   •  Diclofenac Sodium 1 % Gel, Apply 1 Application to skin as directed 3 times a day., Disp: 1 Tube, Rfl: 0  •  melatonin 3 MG Tab, Take 3 mg by mouth every bedtime., Disp: , Rfl:   ALLERGIES:   Allergies   Allergen Reactions   • Morphine Vomiting     SURGHX:   Past Surgical History:   Procedure Laterality Date   • CARPAL TUNNEL ENDOSCOPIC Right 11/3/2016    Procedure: ENDOSCOPIC CARPAL TUNNEL RELEASE;  Surgeon: Honorio Pritchard M.D.;  Location: SURGERY [facility];  Service:    • OTHER ABDOMINAL SURGERY  2006    cholecystectomy   • OTHER ORTHOPEDIC SURGERY Left 2005    rotator cuff repair   • OTHER ORTHOPEDIC SURGERY Right 1978    rt shoulder dislocation repair   • CARPAL TUNNEL RELEASE     • CHOLECYSTECTOMY     • " "OTHER      bilateral cataracts removed     SOCHX:  reports that she quit smoking about 38 years ago. Her smoking use included Cigarettes. She has a 2.50 pack-year smoking history. She has never used smokeless tobacco. She reports that she does not drink alcohol or use drugs.  FH: Family history was reviewed, no pertinent findings to report       Review of Systems   Constitutional: Negative for chills, fever and malaise/fatigue.   HENT: Positive for congestion and sore throat. Negative for ear pain.    Eyes: Negative for discharge and redness.   Respiratory: Negative for cough, shortness of breath and wheezing.    Gastrointestinal: Negative for abdominal pain, constipation, diarrhea, nausea and vomiting.   Genitourinary: Negative for dysuria, flank pain, frequency, hematuria and urgency.   Musculoskeletal: Negative for myalgias and neck pain.   Skin: Negative for itching and rash.   Neurological: Negative for dizziness, weakness and headaches.   Endo/Heme/Allergies: Negative for environmental allergies.   All other systems reviewed and are negative.         Objective:     /88   Pulse 78   Temp 36.4 °C (97.6 °F)   Resp 16   Ht 1.676 m (5' 6\")   Wt 114.3 kg (252 lb)   SpO2 97%   BMI 40.67 kg/m²      Physical Exam   Constitutional: She is oriented to person, place, and time. Vital signs are normal. She appears well-developed and well-nourished. She is active and cooperative.  Non-toxic appearance. She does not have a sickly appearance. She does not appear ill. No distress.   HENT:   Head: Normocephalic.   Right Ear: External ear and ear canal normal. Tympanic membrane is injected.   Left Ear: External ear and ear canal normal. Tympanic membrane is injected.   Nose: Mucosal edema present. No rhinorrhea.   Mouth/Throat: Uvula is midline. Mucous membranes are dry. No uvula swelling. Posterior oropharyngeal erythema present.   Eyes: Pupils are equal, round, and reactive to light. Conjunctivae and EOM are " normal.   Neck: Normal range of motion. Neck supple. No spinous process tenderness and no muscular tenderness present. No neck rigidity. No edema, no erythema and normal range of motion present. No thyroid mass and no thyromegaly present.   Cardiovascular: Normal rate and regular rhythm.    Pulmonary/Chest: Effort normal and breath sounds normal. No accessory muscle usage. No respiratory distress. She has no decreased breath sounds. She has no wheezes. She has no rhonchi. She has no rales.   Musculoskeletal: Normal range of motion.   Lymphadenopathy:     She has no cervical adenopathy.   Neurological: She is alert and oriented to person, place, and time.   Skin: Skin is warm and dry. She is not diaphoretic.   Vitals reviewed.              Assessment/Plan:     1. Sore throat    - POCT Rapid Strep A: NEG    2. Acute maxillary sinusitis, recurrence not specified    - amoxicillin-clavulanate (AUGMENTIN) 875-125 MG Tab; Take 1 Tab by mouth 2 times a day for 7 days.  Dispense: 14 Tab; Refill: 0  - fluticasone (FLONASE) 50 MCG/ACT nasal spray; Spray 2 Sprays in nose every day.  Dispense: 1 Bottle; Refill: 3    3. Yeast infection of the vagina    - fluconazole (DIFLUCAN) 150 MG tablet; Take 1 Tab by mouth Once for 1 dose. May repeat in 72 hours, if no improvement.  Dispense: 2 Tab; Refill: 0    Contingent antibiotic prescription given to patient to fill upon meeting criteria of guidelines discussed.   Increase water intake  Get rest  May use Ibuprofen/Tylenol prn for any fever, body aches or throat pain  May take longer acting antihistamine for seasonal allergy symptoms prn  May use saline nasal spray for nasal congestion  May use Flonase or Nasocort for allergen nasal congestion  May gargle with salt water prn for throat discomfort  May drink smoothies for nutrition if too painful to swallow solid foods  Monitor for productive cough, SOB and chest pain/tightness- need re-evaluation    May use external vaginal yeast cream  and Vagisil for external rritation/itching  Increase water intake  Urinate more frequently and empty bladder completely  Practice good toileting hygiene after bowel movements and sexual intercourse, refrain from sexual intercourse until infection cleared  Monitor for back/flank pain, difficulty urinating, blood in urine, fever, low pelvic pain- need re-evaluation

## 2019-03-24 ENCOUNTER — OFFICE VISIT (OUTPATIENT)
Dept: URGENT CARE | Facility: CLINIC | Age: 74
End: 2019-03-24
Payer: MEDICARE

## 2019-03-24 VITALS
OXYGEN SATURATION: 95 % | TEMPERATURE: 96.5 F | SYSTOLIC BLOOD PRESSURE: 150 MMHG | HEART RATE: 62 BPM | DIASTOLIC BLOOD PRESSURE: 70 MMHG | BODY MASS INDEX: 40.5 KG/M2 | RESPIRATION RATE: 18 BRPM | HEIGHT: 66 IN | WEIGHT: 252 LBS

## 2019-03-24 DIAGNOSIS — M54.31 SCIATICA, RIGHT SIDE: ICD-10-CM

## 2019-03-24 PROCEDURE — 99214 OFFICE O/P EST MOD 30 MIN: CPT | Performed by: FAMILY MEDICINE

## 2019-03-24 RX ORDER — ASPIRIN 300 MG/1
300 SUPPOSITORY RECTAL DAILY
COMMUNITY
Start: 2019-09-29 | End: 2019-10-02

## 2019-03-24 RX ORDER — BACLOFEN 10 MG/1
10 TABLET ORAL
Qty: 15 TAB | Refills: 0 | Status: SHIPPED | OUTPATIENT
Start: 2019-03-24

## 2019-03-24 RX ORDER — CELECOXIB 100 MG/1
100 CAPSULE ORAL 2 TIMES DAILY
Qty: 60 CAP | Refills: 0 | Status: SHIPPED | OUTPATIENT
Start: 2019-03-24

## 2019-03-24 ASSESSMENT — ENCOUNTER SYMPTOMS
NUMBNESS: 0
WEAKNESS: 0
FEVER: 0
LEG PAIN: 1

## 2019-03-25 NOTE — PROGRESS NOTES
"Subjective:   Kaia Sorto is a 74 y.o. female who presents for Hip Problem (Rt hip, Rt Leg  pain x 2days ago, possible sciatic )        Leg Pain   This is a new problem. The current episode started in the past 7 days. The problem occurs constantly. The problem has been rapidly worsening. Pertinent negatives include no fever, numbness or weakness.     Review of Systems   Constitutional: Negative for fever.   Neurological: Negative for weakness and numbness.     Allergies   Allergen Reactions   • Morphine Vomiting      Objective:   /70   Pulse 62   Temp 35.8 °C (96.5 °F)   Resp 18   Ht 1.676 m (5' 6\")   Wt 114.3 kg (252 lb)   SpO2 95%   BMI 40.67 kg/m²   Physical Exam   Constitutional: She is oriented to person, place, and time. She appears well-developed and well-nourished. No distress.   HENT:   Head: Normocephalic and atraumatic.   Eyes: Pupils are equal, round, and reactive to light. Conjunctivae and EOM are normal.   Cardiovascular: Normal rate and regular rhythm.    No murmur heard.  Pulmonary/Chest: Effort normal and breath sounds normal. No respiratory distress.   Abdominal: Soft. She exhibits no distension. There is no tenderness.   Musculoskeletal:        Right hip: She exhibits tenderness. She exhibits normal range of motion and normal strength.   Neurological: She is alert and oriented to person, place, and time. She has normal reflexes. No sensory deficit.   Skin: Skin is warm and dry.   Psychiatric: She has a normal mood and affect.         Assessment/Plan:   1. Sciatica, right side  - celecoxib (CELEBREX) 100 MG Cap; Take 1 Cap by mouth 2 times a day.  Dispense: 60 Cap; Refill: 0  - baclofen (LIORESAL) 10 MG Tab; Take 1 Tab by mouth at bedtime as needed.  Dispense: 15 Tab; Refill: 0    Other orders  - aspirin (ASA) 300 MG Suppos; Insert 300 mg in rectum every day.    Differential diagnosis, natural history, supportive care, and indications for immediate follow-up discussed.       "

## 2019-03-28 ENCOUNTER — OFFICE VISIT (OUTPATIENT)
Dept: URGENT CARE | Facility: CLINIC | Age: 74
End: 2019-03-28
Payer: MEDICARE

## 2019-03-28 VITALS
OXYGEN SATURATION: 95 % | HEIGHT: 66 IN | TEMPERATURE: 97.4 F | SYSTOLIC BLOOD PRESSURE: 128 MMHG | HEART RATE: 62 BPM | RESPIRATION RATE: 16 BRPM | DIASTOLIC BLOOD PRESSURE: 80 MMHG | BODY MASS INDEX: 36 KG/M2 | WEIGHT: 224 LBS

## 2019-03-28 DIAGNOSIS — M54.31 SCIATICA OF RIGHT SIDE: ICD-10-CM

## 2019-03-28 PROCEDURE — 99214 OFFICE O/P EST MOD 30 MIN: CPT | Performed by: NURSE PRACTITIONER

## 2019-03-28 RX ORDER — METHYLPREDNISOLONE 4 MG/1
TABLET ORAL
Qty: 21 TAB | Refills: 0 | Status: SHIPPED | OUTPATIENT
Start: 2019-03-28

## 2019-03-29 ASSESSMENT — ENCOUNTER SYMPTOMS
WEAKNESS: 0
TINGLING: 0
HEADACHES: 0
PARESIS: 0
BACK PAIN: 1
PARESTHESIAS: 0
BOWEL INCONTINENCE: 0
WEIGHT LOSS: 0
LEG PAIN: 1
FEVER: 0
ABDOMINAL PAIN: 0
NUMBNESS: 0
PERIANAL NUMBNESS: 0

## 2019-03-29 NOTE — PROGRESS NOTES
"Subjective:      Kaia Sorto is a 74 y.o. female who presents with Hip Pain (x 1 wk, Rt. hip pain, pain travel down on Rt. leg)      Reviewed past medical, surgical and family history with patient. Reviewed prescription and OTC medications with patient in electronic health record today.       Allergies   Allergen Reactions   • Morphine Vomiting         Back Pain   This is a new problem. The current episode started in the past 7 days. The problem occurs constantly. The problem has been gradually worsening since onset. The pain is present in the lumbar spine. The quality of the pain is described as aching. The pain radiates to the right thigh. The pain is at a severity of 4/10. The pain is moderate. The pain is the same all the time. The symptoms are aggravated by coughing and twisting. Associated symptoms include leg pain. Pertinent negatives include no abdominal pain, bladder incontinence, bowel incontinence, chest pain, dysuria, fever, headaches, numbness, paresis, paresthesias, pelvic pain, perianal numbness, tingling, weakness or weight loss. She has tried walking for the symptoms. The treatment provided mild relief.        Review of Systems   Constitutional: Negative for fever and weight loss.   Cardiovascular: Negative for chest pain.   Gastrointestinal: Negative for abdominal pain and bowel incontinence.   Genitourinary: Negative for bladder incontinence, dysuria and pelvic pain.   Musculoskeletal: Positive for back pain.   Neurological: Negative for tingling, weakness, numbness, headaches and paresthesias.          Objective:     /80 (BP Location: Right arm, Patient Position: Sitting, BP Cuff Size: Large adult)   Pulse 62   Temp 36.3 °C (97.4 °F) (Temporal)   Resp 16   Ht 1.676 m (5' 6\")   Wt 101.6 kg (224 lb)   SpO2 95%   BMI 36.15 kg/m²      Physical Exam   Constitutional: She is oriented to person, place, and time. Vital signs are normal. She appears well-developed and well-nourished. " She is cooperative.  Non-toxic appearance. No distress.   HENT:   Head: Normocephalic.   Cardiovascular: Normal rate, regular rhythm and normal pulses.    Pulmonary/Chest: Effort normal and breath sounds normal.   Musculoskeletal: Normal range of motion.   Neurological: She is alert and oriented to person, place, and time. She has normal strength. Gait normal. GCS eye subscore is 4. GCS verbal subscore is 5. GCS motor subscore is 6.   Reflex Scores:       Patellar reflexes are 2+ on the right side and 2+ on the left side.       Achilles reflexes are 2+ on the right side.  Skin: Skin is warm and dry. Capillary refill takes less than 2 seconds.   Psychiatric: She has a normal mood and affect. Her speech is normal and behavior is normal. Judgment and thought content normal. Cognition and memory are normal.   Nursing note and vitals reviewed.              Assessment/Plan:     1. Sciatica of right side  MethylPREDNISolone (MEDROL DOSEPAK) 4 MG Tablet Therapy Pack       Stop Celebrex. No NSAIDS while taking steroid medication   OTC analgesic creams/ rubs     Return to clinic or PCP 4-5 days if current symptoms are not resolving in a satisfactory manner or sooner if new or worsening symptoms occur.   Patient was advised of signs and symptoms which would warrant further evaluation and /or emergent evaluation in ER.  Verbalized agreement with this treatment plan and seemed to understand without barriers. Questions were encouraged and answered to patients satisfaction.     Aftercare instructions were given to pt    Educated in proper administration of medication(s) ordered today including safety, possible SE, risks, benefits, rationale and alternatives to therapy.

## 2019-04-01 ENCOUNTER — OFFICE VISIT (OUTPATIENT)
Dept: URGENT CARE | Facility: CLINIC | Age: 74
End: 2019-04-01
Payer: MEDICARE

## 2019-04-01 VITALS
BODY MASS INDEX: 36 KG/M2 | TEMPERATURE: 98 F | HEART RATE: 76 BPM | SYSTOLIC BLOOD PRESSURE: 132 MMHG | HEIGHT: 66 IN | DIASTOLIC BLOOD PRESSURE: 92 MMHG | RESPIRATION RATE: 18 BRPM | OXYGEN SATURATION: 92 % | WEIGHT: 224 LBS

## 2019-04-01 DIAGNOSIS — M54.31 SCIATICA OF RIGHT SIDE: ICD-10-CM

## 2019-04-01 PROCEDURE — 99214 OFFICE O/P EST MOD 30 MIN: CPT | Performed by: PHYSICIAN ASSISTANT

## 2019-04-01 RX ORDER — PREDNISONE 20 MG/1
TABLET ORAL
Qty: 10 TAB | Refills: 0 | Status: SHIPPED | OUTPATIENT
Start: 2019-04-01

## 2019-04-01 RX ORDER — PREDNISONE 10 MG/1
10 TABLET ORAL DAILY
COMMUNITY

## 2019-04-01 RX ORDER — HYDROCODONE BITARTRATE AND ACETAMINOPHEN 5; 325 MG/1; MG/1
1 TABLET ORAL EVERY 8 HOURS PRN
Qty: 21 TAB | Refills: 0 | Status: SHIPPED | OUTPATIENT
Start: 2019-04-01 | End: 2019-04-08

## 2019-04-03 NOTE — PROGRESS NOTES
Subjective:      Kaia Sorto is a 74 y.o. female who presents with Leg Pain (Rt leg sciatica. Medication not working )          Patient is a pleasant 74-year-old female who presents to urgent care with concern regarding her right sided sciatica pain.  Patient was originally evaluated on the 24th and then again on the 28th for similar problem of which she reports she was given baclofen, Celebrex, and then more recently on the 28th she was started on a methylprednisolone taper.  Patient with the first few days of the steroid with significant relief his symptoms however as she began to taper the pain has since returned.  Patient is mainly concerned as she leaves for [place] in 2 days and is concerned about the severity of her discomfort and potential worsening symptoms.  Patient has risk requesting something for pain specifically at nighttime.  Of note patient does have history of CKD in the past however denies any abnormal sugars or history of tendon rupture.  Patient does report past history of sciatica in the past however does not feel that it is lasted this long.  She denies any specific trauma, fall or injury.  She also denies any leg weakness, saddle anesthesias or new incontinence.  Back Pain   This is a new problem. The current episode started 1 to 4 weeks ago. The problem occurs daily. The problem has been waxing and waning since onset. The pain is present in the lumbar spine and gluteal. The quality of the pain is described as burning. Radiates to: Right posterior thigh.  The pain is at a severity of 8/10. The pain is moderate. Associated symptoms include tingling. Pertinent negatives include no bladder incontinence, bowel incontinence, chest pain, dysuria, fever, headaches, leg pain, numbness, pelvic pain or perianal numbness. Treatments tried: As above.  The treatment provided mild relief.       Review of Systems   Constitutional: Negative for chills, fever and malaise/fatigue.   HENT: Negative for ear  "discharge, ear pain and sore throat.    Eyes: Negative for discharge and redness.   Respiratory: Negative for cough.    Cardiovascular: Negative for chest pain.   Gastrointestinal: Negative for bowel incontinence, diarrhea and nausea.   Genitourinary: Negative for bladder incontinence, dysuria, pelvic pain and urgency.        Denies any new urinary or bowel incontinence   Musculoskeletal: Positive for back pain. Negative for falls and neck pain.        Specifically without leg pain or weakness   Skin: Negative for rash.   Neurological: Positive for tingling. Negative for sensory change, numbness and headaches.          Objective:     /92   Pulse 76   Temp 36.7 °C (98 °F)   Resp 18   Ht 1.676 m (5' 6\")   Wt 101.6 kg (224 lb)   SpO2 92%   BMI 36.15 kg/m²    PMH:  has a past medical history of Arthritis; Chickenpox; Depression; Disorder of thyroid; Hypertension; Hypothyroidism; Influenza; Mumps; Sleep apnea; Snoring; and Tonsillitis.  MEDS:   Current Outpatient Prescriptions:   •  predniSONE (DELTASONE) 10 MG Tab, Take 10 mg by mouth every day., Disp: , Rfl:   •  predniSONE (DELTASONE) 20 MG Tab, Take 2 tabs po daily for 5 days., Disp: 10 Tab, Rfl: 0  •  HYDROcodone-acetaminophen (NORCO) 5-325 MG Tab per tablet, Take 1 Tab by mouth every 8 hours as needed for up to 7 days., Disp: 21 Tab, Rfl: 0  •  NON SPECIFIED, Currently taking a muscle relaxer, Disp: , Rfl:   •  MethylPREDNISolone (MEDROL DOSEPAK) 4 MG Tablet Therapy Pack, follow package directions, Disp: 21 Tab, Rfl: 0  •  aspirin (ASA) 300 MG Suppos, Insert 300 mg in rectum every day., Disp: , Rfl:   •  celecoxib (CELEBREX) 100 MG Cap, Take 1 Cap by mouth 2 times a day., Disp: 60 Cap, Rfl: 0  •  baclofen (LIORESAL) 10 MG Tab, Take 1 Tab by mouth at bedtime as needed., Disp: 15 Tab, Rfl: 0  •  fluticasone (FLONASE) 50 MCG/ACT nasal spray, Spray 2 Sprays in nose every day., Disp: 1 Bottle, Rfl: 3  •  Diclofenac Sodium 1 % Gel, Apply 1 Application to " skin as directed 3 times a day., Disp: 1 Tube, Rfl: 0  •  OLMESARTAN-AMLODIPINE-HCTZ PO, Take  by mouth., Disp: , Rfl:   •  melatonin 3 MG Tab, Take 3 mg by mouth every bedtime., Disp: , Rfl:   •  levothyroxine (SYNTHROID) 75 MCG Tab, Take 75 mcg by mouth Every morning on an empty stomach., Disp: , Rfl:   ALLERGIES:   Allergies   Allergen Reactions   • Morphine Vomiting     SURGHX:   Past Surgical History:   Procedure Laterality Date   • CARPAL TUNNEL ENDOSCOPIC Right 11/3/2016    Procedure: ENDOSCOPIC CARPAL TUNNEL RELEASE;  Surgeon: Honorio Pritchard M.D.;  Location: SURGERY [facility];  Service:    • OTHER ABDOMINAL SURGERY  2006    cholecystectomy   • OTHER ORTHOPEDIC SURGERY Left 2005    rotator cuff repair   • OTHER ORTHOPEDIC SURGERY Right 1978    rt shoulder dislocation repair   • CARPAL TUNNEL RELEASE     • CHOLECYSTECTOMY     • OTHER      bilateral cataracts removed     SOCHX:  reports that she quit smoking about 38 years ago. Her smoking use included Cigarettes. She has a 2.50 pack-year smoking history. She has never used smokeless tobacco. She reports that she does not drink alcohol or use drugs.  FH: Family history was reviewed, no pertinent findings to report    Physical Exam   Constitutional: She is oriented to person, place, and time. She appears well-developed and well-nourished.   HENT:   Head: Normocephalic and atraumatic.   Eyes: Pupils are equal, round, and reactive to light. Conjunctivae and EOM are normal.   Neck: Normal range of motion. Neck supple. No tracheal deviation present.   Cardiovascular: Normal rate.    Pulmonary/Chest: Effort normal. No respiratory distress.   Musculoskeletal:        Back:    Tenderness over the right sacroiliac notch with reproducible symptoms. DTRs equal,   Spine- without midline tenderness, step-off or deformity. Without scoliosis or kyphosis. Without noted paraspinous spasm. Limited ROM with lateral bend and lateral kicks. Sensation intact bilaterally, BLE  motor 5/5 and symmetrical  strength.  Gait- Slow without foot drop.      Neurological: She is alert and oriented to person, place, and time.   Skin: Skin is warm. No rash noted.   Psychiatric: She has a normal mood and affect. Her behavior is normal. Judgment and thought content normal.   Vitals reviewed.              Assessment/Plan:     1. Sciatica of right side  - predniSONE (DELTASONE) 20 MG Tab; Take 2 tabs po daily for 5 days.  Dispense: 10 Tab; Refill: 0  - HYDROcodone-acetaminophen (NORCO) 5-325 MG Tab per tablet; Take 1 Tab by mouth every 8 hours as needed for up to 7 days.  Dispense: 21 Tab; Refill: 0  - Consent for Opiate Prescription  - REFERRAL TO PHYSICAL THERAPY Reason for Therapy: Eval/Treat/Report    At this time concern for past history of CKD-also [place] and is concerned regarding night time pain and comfort on her flight.   Will write for Norco and discussed at length risk of sedation, potential for fall, ect.   NARXCHECK was reviewed by myself-  Document does not reveal any concerning patterns. Pt. was advised to avoid the operation of heavy machine along with driving while on such medications. Finally pt. was advised to use medication only as prescribed.     Finally pt. Did very well on the first few days of the medrol- will place the patient on prednisone for 5 days. She is to avoid OTC NSAIDs while on steroid.   Finally discussed stretching along with heat. I will also refer this patient to PT as she returns.   Patient given precautionary s/sx that mandate immediate follow up and evaluation in the ED. Advised of risks of not doing so.    DDX, Supportive care, and indications for immediate follow-up discussed with patient.    Instructed to return to clinic or nearest emergency department if we are not available for any change in condition, further concerns, or worsening of symptoms.    The patient demonstrated a good understanding and agreed with the treatment  plan.  Please note that this dictation was created using voice recognition software. I have made every reasonable attempt to correct obvious errors, but I expect that there are errors of grammar and possibly content that I did not discover before finalizing the note.

## 2019-04-04 ASSESSMENT — ENCOUNTER SYMPTOMS
TINGLING: 1
SORE THROAT: 0
NAUSEA: 0
LEG PAIN: 0
CHILLS: 0
EYE DISCHARGE: 0
NUMBNESS: 0
SENSORY CHANGE: 0
DIARRHEA: 0
PERIANAL NUMBNESS: 0
FEVER: 0
COUGH: 0
BOWEL INCONTINENCE: 0
EYE REDNESS: 0
NECK PAIN: 0
HEADACHES: 0
BACK PAIN: 1
FALLS: 0

## 2019-10-03 NOTE — PROGRESS NOTES
CC: Follow up for TALIA on auto titrating CPAP 5 to 15 cm H2O    HPI:  Ms. Kaia Sorto is a 75 y/o F who was last seen 3/8/2019. Home sleep study 8/10/2017 indicated moderate objective sleep apnea with an AHI of 20.2 and a anupam of 73%. She was started on auto CPAP 5-15 cm H2O.  When last seen 3/8/2019 her compliance was excellent with a normalized AHI.      Today, 10/4/2019, her 30-day compliance is 100% for 7 hours and 45 minutes.  She has a residual average apnea hypopnea index of 3.1 on auto titrating CPAP 5 to 15 cm H2O.  Her auto CPAP mean pressure is 8.0 she has minimal leak with average time in large leak a day of 22 seconds.    The patient reports improved symptoms using positive airway pressure.  Has experienced no significant problems with mask fit, mask leak, pressure tolerance, excessive airway dryness, nasal congestion, aerophagia, or chest pain.      Significant comorbidities and modifying factors include obesity, back pain, heel pain, hypertension, hypothyroidism, need for influenza vaccination, and history of tobacco abuse        Patient Active Problem List    Diagnosis Date Noted   • Class 3 severe obesity due to excess calories with body mass index (BMI) of 40.0 to 44.9 in adult ([facility]) 09/21/2018   • History of tobacco abuse 09/21/2018   • Need for influenza vaccination 09/21/2018   • Obstructive sleep apnea 08/16/2017   • Essential hypertension 08/16/2017   • Hypothyroidism 08/16/2017   • Adult BMI 40.0-44.9 kg/sq m ([facility]) 08/16/2017   • Carpal tunnel syndrome 11/03/2016       Past Medical History:   Diagnosis Date   • Arthritis     wrists   • Chickenpox    • Depression    • Disorder of thyroid    • Hypertension    • Hypothyroidism    • Influenza    • Mumps    • Sleep apnea    • Snoring    • Tonsillitis         Past Surgical History:   Procedure Laterality Date   • CARPAL TUNNEL ENDOSCOPIC Right 11/3/2016    Procedure: ENDOSCOPIC CARPAL TUNNEL RELEASE;  Surgeon: Honorio Pritchard M.D.;   Location: SURGERY [facility];  Service:    • OTHER ABDOMINAL SURGERY  2006    cholecystectomy   • OTHER ORTHOPEDIC SURGERY Left     rotator cuff repair   • OTHER ORTHOPEDIC SURGERY Right     rt shoulder dislocation repair   • CARPAL TUNNEL RELEASE     • CHOLECYSTECTOMY     • OTHER      bilateral cataracts removed       Family History   Problem Relation Age of Onset   • Heart Failure Mother    • Heart Disease Father    • Sleep Apnea Neg Hx        Social History     Socioeconomic History   • Marital status: Single     Spouse name: Not on file   • Number of children: Not on file   • Years of education: Not on file   • Highest education level: Not on file   Occupational History   • Not on file   Social Needs   • Financial resource strain: Not on file   • Food insecurity:     Worry: Not on file     Inability: Not on file   • Transportation needs:     Medical: Not on file     Non-medical: Not on file   Tobacco Use   • Smoking status: Former Smoker     Packs/day: 0.25     Years: 10.00     Pack years: 2.50     Types: Cigarettes     Last attempt to quit: 1981     Years since quittin.7   • Smokeless tobacco: Never Used   Substance and Sexual Activity   • Alcohol use: No     Alcohol/week: 0.0 - 0.6 oz     Comment: Occ   • Drug use: No   • Sexual activity: Not on file   Lifestyle   • Physical activity:     Days per week: Not on file     Minutes per session: Not on file   • Stress: Not on file   Relationships   • Social connections:     Talks on phone: Not on file     Gets together: Not on file     Attends Tenriism service: Not on file     Active member of club or organization: Not on file     Attends meetings of clubs or organizations: Not on file     Relationship status: Not on file   • Intimate partner violence:     Fear of current or ex partner: Not on file     Emotionally abused: Not on file     Physically abused: Not on file     Forced sexual activity: Not on file   Other Topics Concern   • Not on  "file   Social History Narrative   • Not on file       Current Outpatient Medications   Medication Sig Dispense Refill   • OLMESARTAN-AMLODIPINE-HCTZ PO Take  by mouth.     • melatonin 3 MG Tab Take 3 mg by mouth every bedtime.     • predniSONE (DELTASONE) 10 MG Tab Take 10 mg by mouth every day.     • predniSONE (DELTASONE) 20 MG Tab Take 2 tabs po daily for 5 days. (Patient not taking: Reported on 10/4/2019) 10 Tab 0   • NON SPECIFIED Currently taking a muscle relaxer     • MethylPREDNISolone (MEDROL DOSEPAK) 4 MG Tablet Therapy Pack follow package directions (Patient not taking: Reported on 10/4/2019) 21 Tab 0   • celecoxib (CELEBREX) 100 MG Cap Take 1 Cap by mouth 2 times a day. (Patient not taking: Reported on 10/4/2019) 60 Cap 0   • baclofen (LIORESAL) 10 MG Tab Take 1 Tab by mouth at bedtime as needed. (Patient not taking: Reported on 10/4/2019) 15 Tab 0   • fluticasone (FLONASE) 50 MCG/ACT nasal spray Spray 2 Sprays in nose every day. (Patient not taking: Reported on 10/4/2019) 1 Bottle 3   • Diclofenac Sodium 1 % Gel Apply 1 Application to skin as directed 3 times a day. 1 Tube 0   • levothyroxine (SYNTHROID) 75 MCG Tab Take 75 mcg by mouth Every morning on an empty stomach.       No current facility-administered medications for this visit.     \"CURRENT RX\"    ALLERGIES: Morphine    ROS    Constitutional: Denies fever, chills, sweats,  weight loss, fatigue  Cardiovascular: Denies chest pain, tightness, palpitations, swelling in legs/feet, fainting, difficulty breathing when lying down but gets better when sitting up.   Respiratory: Denies shortness of breath, cough, sputum, wheezing, painful breathing, coughing up blood.   Sleep: per HPI  Gastrointestinal: Denies  difficulty swallowing, nausea, abdominal pain, diarrhea, constipation, heartburn.  Musculoskeletal: Denies painful joints, sore muscles, back pain.        PHYSICAL EXAM  Obese.    /64 (BP Location: Right arm, Patient Position: Sitting, BP " "Cuff Size: Large adult)   Pulse 60   Resp 16   Ht 1.651 m (5' 5\")   Wt 111.6 kg (246 lb)   SpO2 95%   BMI 40.94 kg/m²   Appearance: Well-nourished, well-developed, no acute distress  Eyes:  PERRLA, EOMI; glasses  ENMT: without lesions, deformities;hearing grossly intact; tongue normal, posterior pharynx without erythema or exudate; Mallampati classification: 4  Neck: Supple, trachea midline, no masses  Respiratory effort:  No intercostal retractions or use of accessory muscles  Lung auscultation:  No wheezes rhonchi rubs or rales  Cardiac: No murmurs, rubs, or gallops; regular rhythm, normal rate; no edema  Abdomen:  No tenderness, no organomegaly.  Obese  Musculoskeletal:  Grossly normal; gait and station normal; digits and nails normal  Skin:  No rashes, petechiae, cyanosis  Neurologic: without focal signs; oriented to person, time, place, and purpose; judgement intact  Psychiatric:  No depression, anxiety, agitation        PROBLEMS:  1. Obstructive sleep apnea    - DME Mask and Supplies    2. Essential hypertension      3. Class 3 severe obesity due to excess calories without serious comorbidity with body mass index (BMI) of 40.0 to 44.9 in adult ([facility])    - OBESITY COUNSELING (No Charge): Patient identified as having weight management issue.  Appropriate orders and counseling given.    4. History of tobacco abuse      5. Up to date with flu      6. Hypothyroidism, unspecified type        PLAN:   Has been advised to continue the current AutoPap 5 to 15 cm H2O, clean equipment frequently, and get new mask and supplies as allowed by insurance and DME. Advised about potential problems including nasal obstruction/stuffiness, mask leak or discomfort , frequent awakenings, chill or dryness of the upper airway, noise, inconvenience, and lack of benefit. Recommend an earlier appointment, if significant treatment barriers develop.    Have advised the patient to follow up with the appropriate healthcare practitioners " for all other medical problems and issues.        Return in about 6 months (around 4/4/2020).

## 2019-10-04 ENCOUNTER — SLEEP CENTER VISIT (OUTPATIENT)
Dept: SLEEP MEDICINE | Facility: MEDICAL CENTER | Age: 74
End: 2019-10-04
Payer: MEDICARE

## 2019-10-04 VITALS
OXYGEN SATURATION: 95 % | HEART RATE: 60 BPM | DIASTOLIC BLOOD PRESSURE: 64 MMHG | BODY MASS INDEX: 40.98 KG/M2 | WEIGHT: 246 LBS | RESPIRATION RATE: 16 BRPM | SYSTOLIC BLOOD PRESSURE: 120 MMHG | HEIGHT: 65 IN

## 2019-10-04 DIAGNOSIS — I10 ESSENTIAL HYPERTENSION: ICD-10-CM

## 2019-10-04 DIAGNOSIS — Z87.891 HISTORY OF TOBACCO ABUSE: ICD-10-CM

## 2019-10-04 DIAGNOSIS — Z92.29 UP TO DATE WITH INFLUENZA VACCINATION: ICD-10-CM

## 2019-10-04 DIAGNOSIS — E66.01 CLASS 3 SEVERE OBESITY DUE TO EXCESS CALORIES WITHOUT SERIOUS COMORBIDITY WITH BODY MASS INDEX (BMI) OF 40.0 TO 44.9 IN ADULT (HCC): ICD-10-CM

## 2019-10-04 DIAGNOSIS — G47.33 OBSTRUCTIVE SLEEP APNEA: ICD-10-CM

## 2019-10-04 DIAGNOSIS — E03.9 HYPOTHYROIDISM, UNSPECIFIED TYPE: ICD-10-CM

## 2019-10-04 PROCEDURE — 99214 OFFICE O/P EST MOD 30 MIN: CPT | Performed by: INTERNAL MEDICINE

## 2020-04-10 ENCOUNTER — HOSPITAL ENCOUNTER (OUTPATIENT)
Dept: LAB | Facility: MEDICAL CENTER | Age: 75
End: 2020-04-10
Attending: FAMILY MEDICINE
Payer: MEDICARE

## 2020-04-10 LAB
ALBUMIN SERPL BCP-MCNC: 4.5 G/DL (ref 3.2–4.9)
ALBUMIN/GLOB SERPL: 1.4 G/DL
ALP SERPL-CCNC: 75 U/L (ref 30–99)
ALT SERPL-CCNC: 19 U/L (ref 2–50)
ANION GAP SERPL CALC-SCNC: 14 MMOL/L (ref 7–16)
AST SERPL-CCNC: 17 U/L (ref 12–45)
BASOPHILS # BLD AUTO: 0.6 % (ref 0–1.8)
BASOPHILS # BLD: 0.05 K/UL (ref 0–0.12)
BILIRUB SERPL-MCNC: 0.3 MG/DL (ref 0.1–1.5)
BUN SERPL-MCNC: 39 MG/DL (ref 8–22)
CALCIUM SERPL-MCNC: 9.4 MG/DL (ref 8.5–10.5)
CHLORIDE SERPL-SCNC: 90 MMOL/L (ref 96–112)
CHOLEST SERPL-MCNC: 190 MG/DL (ref 100–199)
CO2 SERPL-SCNC: 24 MMOL/L (ref 20–33)
CREAT SERPL-MCNC: 1.34 MG/DL (ref 0.5–1.4)
EOSINOPHIL # BLD AUTO: 0.12 K/UL (ref 0–0.51)
EOSINOPHIL NFR BLD: 1.5 % (ref 0–6.9)
ERYTHROCYTE [DISTWIDTH] IN BLOOD BY AUTOMATED COUNT: 47.6 FL (ref 35.9–50)
GLOBULIN SER CALC-MCNC: 3.2 G/DL (ref 1.9–3.5)
GLUCOSE SERPL-MCNC: 101 MG/DL (ref 65–99)
HCT VFR BLD AUTO: 38.7 % (ref 37–47)
HDLC SERPL-MCNC: 88 MG/DL
HGB BLD-MCNC: 13.1 G/DL (ref 12–16)
IMM GRANULOCYTES # BLD AUTO: 0.03 K/UL (ref 0–0.11)
IMM GRANULOCYTES NFR BLD AUTO: 0.4 % (ref 0–0.9)
LDLC SERPL CALC-MCNC: 89 MG/DL
LYMPHOCYTES # BLD AUTO: 2.06 K/UL (ref 1–4.8)
LYMPHOCYTES NFR BLD: 25.5 % (ref 22–41)
MCH RBC QN AUTO: 31.5 PG (ref 27–33)
MCHC RBC AUTO-ENTMCNC: 33.9 G/DL (ref 33.6–35)
MCV RBC AUTO: 93 FL (ref 81.4–97.8)
MONOCYTES # BLD AUTO: 0.62 K/UL (ref 0–0.85)
MONOCYTES NFR BLD AUTO: 7.7 % (ref 0–13.4)
NEUTROPHILS # BLD AUTO: 5.2 K/UL (ref 2–7.15)
NEUTROPHILS NFR BLD: 64.3 % (ref 44–72)
NRBC # BLD AUTO: 0 K/UL
NRBC BLD-RTO: 0 /100 WBC
PLATELET # BLD AUTO: 283 K/UL (ref 164–446)
PMV BLD AUTO: 10.7 FL (ref 9–12.9)
POTASSIUM SERPL-SCNC: 4.3 MMOL/L (ref 3.6–5.5)
PROT SERPL-MCNC: 7.7 G/DL (ref 6–8.2)
RBC # BLD AUTO: 4.16 M/UL (ref 4.2–5.4)
SODIUM SERPL-SCNC: 128 MMOL/L (ref 135–145)
TRIGL SERPL-MCNC: 66 MG/DL (ref 0–149)
TSH SERPL DL<=0.005 MIU/L-ACNC: 2.62 UIU/ML (ref 0.38–5.33)
WBC # BLD AUTO: 8.1 K/UL (ref 4.8–10.8)

## 2020-04-10 PROCEDURE — 80053 COMPREHEN METABOLIC PANEL: CPT

## 2020-04-10 PROCEDURE — 85025 COMPLETE CBC W/AUTO DIFF WBC: CPT

## 2020-04-10 PROCEDURE — 80061 LIPID PANEL: CPT

## 2020-04-10 PROCEDURE — 84443 ASSAY THYROID STIM HORMONE: CPT

## 2020-04-10 PROCEDURE — 36415 COLL VENOUS BLD VENIPUNCTURE: CPT

## 2020-10-26 ENCOUNTER — HOSPITAL ENCOUNTER (OUTPATIENT)
Dept: RADIOLOGY | Facility: MEDICAL CENTER | Age: 75
End: 2020-10-26
Attending: FAMILY MEDICINE
Payer: MEDICARE

## 2020-10-26 DIAGNOSIS — M81.0 OSTEOPOROSIS, UNSPECIFIED OSTEOPOROSIS TYPE, UNSPECIFIED PATHOLOGICAL FRACTURE PRESENCE: ICD-10-CM

## 2020-10-26 DIAGNOSIS — Z12.31 VISIT FOR SCREENING MAMMOGRAM: ICD-10-CM

## 2020-10-26 PROCEDURE — 77080 DXA BONE DENSITY AXIAL: CPT

## 2020-10-26 PROCEDURE — 77067 SCR MAMMO BI INCL CAD: CPT

## 2021-01-16 DIAGNOSIS — Z23 NEED FOR VACCINATION: ICD-10-CM

## 2021-09-27 ENCOUNTER — HOSPITAL ENCOUNTER (OUTPATIENT)
Dept: LAB | Facility: MEDICAL CENTER | Age: 76
End: 2021-09-27
Attending: FAMILY MEDICINE
Payer: MEDICARE

## 2021-09-27 LAB
ALBUMIN SERPL BCP-MCNC: 3.8 G/DL (ref 3.2–4.9)
ALBUMIN/GLOB SERPL: 1 G/DL
ALP SERPL-CCNC: 81 U/L (ref 30–99)
ALT SERPL-CCNC: 9 U/L (ref 2–50)
ANION GAP SERPL CALC-SCNC: 13 MMOL/L (ref 7–16)
ANISOCYTOSIS BLD QL SMEAR: ABNORMAL
AST SERPL-CCNC: 11 U/L (ref 12–45)
BASOPHILS # BLD AUTO: 0.9 % (ref 0–1.8)
BASOPHILS # BLD: 0.07 K/UL (ref 0–0.12)
BILIRUB SERPL-MCNC: 0.4 MG/DL (ref 0.1–1.5)
BUN SERPL-MCNC: 38 MG/DL (ref 8–22)
CALCIUM SERPL-MCNC: 9.7 MG/DL (ref 8.5–10.5)
CHLORIDE SERPL-SCNC: 106 MMOL/L (ref 96–112)
CHOLEST SERPL-MCNC: 166 MG/DL (ref 100–199)
CO2 SERPL-SCNC: 20 MMOL/L (ref 20–33)
CREAT SERPL-MCNC: 1.21 MG/DL (ref 0.5–1.4)
EOSINOPHIL # BLD AUTO: 0.22 K/UL (ref 0–0.51)
EOSINOPHIL NFR BLD: 2.8 % (ref 0–6.9)
ERYTHROCYTE [DISTWIDTH] IN BLOOD BY AUTOMATED COUNT: 58.5 FL (ref 35.9–50)
FASTING STATUS PATIENT QL REPORTED: NORMAL
GLOBULIN SER CALC-MCNC: 3.9 G/DL (ref 1.9–3.5)
GLUCOSE SERPL-MCNC: 104 MG/DL (ref 65–99)
HCT VFR BLD AUTO: 44.4 % (ref 37–47)
HDLC SERPL-MCNC: 69 MG/DL
HGB BLD-MCNC: 13.7 G/DL (ref 12–16)
LDLC SERPL CALC-MCNC: 84 MG/DL
LYMPHOCYTES # BLD AUTO: 1.86 K/UL (ref 1–4.8)
LYMPHOCYTES NFR BLD: 24.1 % (ref 22–41)
MACROCYTES BLD QL SMEAR: ABNORMAL
MANUAL DIFF BLD: NORMAL
MCH RBC QN AUTO: 30.4 PG (ref 27–33)
MCHC RBC AUTO-ENTMCNC: 30.9 G/DL (ref 33.6–35)
MCV RBC AUTO: 98.7 FL (ref 81.4–97.8)
MICROCYTES BLD QL SMEAR: ABNORMAL
MONOCYTES # BLD AUTO: 0.14 K/UL (ref 0–0.85)
MONOCYTES NFR BLD AUTO: 1.8 % (ref 0–13.4)
MORPHOLOGY BLD-IMP: NORMAL
NEUTROPHILS # BLD AUTO: 5.42 K/UL (ref 2–7.15)
NEUTROPHILS NFR BLD: 70.4 % (ref 44–72)
NRBC # BLD AUTO: 0 K/UL
NRBC BLD-RTO: 0 /100 WBC
PLATELET # BLD AUTO: 253 K/UL (ref 164–446)
PLATELET BLD QL SMEAR: NORMAL
PMV BLD AUTO: 10.7 FL (ref 9–12.9)
POIKILOCYTOSIS BLD QL SMEAR: NORMAL
POTASSIUM SERPL-SCNC: 5.1 MMOL/L (ref 3.6–5.5)
PROT SERPL-MCNC: 7.7 G/DL (ref 6–8.2)
RBC # BLD AUTO: 4.5 M/UL (ref 4.2–5.4)
RBC BLD AUTO: PRESENT
SODIUM SERPL-SCNC: 139 MMOL/L (ref 135–145)
TARGETS BLD QL SMEAR: NORMAL
TRIGL SERPL-MCNC: 66 MG/DL (ref 0–149)
TSH SERPL DL<=0.005 MIU/L-ACNC: 2.52 UIU/ML (ref 0.38–5.33)
WBC # BLD AUTO: 7.7 K/UL (ref 4.8–10.8)

## 2021-09-27 PROCEDURE — 85027 COMPLETE CBC AUTOMATED: CPT

## 2021-09-27 PROCEDURE — 80053 COMPREHEN METABOLIC PANEL: CPT

## 2021-09-27 PROCEDURE — 80061 LIPID PANEL: CPT

## 2021-09-27 PROCEDURE — 84443 ASSAY THYROID STIM HORMONE: CPT

## 2021-09-27 PROCEDURE — 36415 COLL VENOUS BLD VENIPUNCTURE: CPT

## 2021-09-27 PROCEDURE — 85007 BL SMEAR W/DIFF WBC COUNT: CPT

## 2021-12-10 ENCOUNTER — HOSPITAL ENCOUNTER (OUTPATIENT)
Dept: LAB | Facility: MEDICAL CENTER | Age: 76
End: 2021-12-10
Attending: FAMILY MEDICINE
Payer: MEDICARE

## 2021-12-10 LAB — TSH SERPL DL<=0.005 MIU/L-ACNC: 1.36 UIU/ML (ref 0.38–5.33)

## 2021-12-10 PROCEDURE — 36415 COLL VENOUS BLD VENIPUNCTURE: CPT

## 2021-12-10 PROCEDURE — 84443 ASSAY THYROID STIM HORMONE: CPT

## 2023-10-13 ENCOUNTER — HOSPITAL ENCOUNTER (OUTPATIENT)
Dept: LAB | Facility: MEDICAL CENTER | Age: 78
End: 2023-10-13
Attending: FAMILY MEDICINE
Payer: MEDICARE

## 2023-10-13 LAB
25(OH)D3 SERPL-MCNC: 70 NG/ML (ref 30–100)
ALBUMIN SERPL BCP-MCNC: 4.1 G/DL (ref 3.2–4.9)
ALBUMIN/GLOB SERPL: 1.3 G/DL
ALP SERPL-CCNC: 73 U/L (ref 30–99)
ALT SERPL-CCNC: 13 U/L (ref 2–50)
ANION GAP SERPL CALC-SCNC: 12 MMOL/L (ref 7–16)
AST SERPL-CCNC: 17 U/L (ref 12–45)
BASOPHILS # BLD AUTO: 0.5 % (ref 0–1.8)
BASOPHILS # BLD: 0.04 K/UL (ref 0–0.12)
BILIRUB SERPL-MCNC: 0.4 MG/DL (ref 0.1–1.5)
BUN SERPL-MCNC: 31 MG/DL (ref 8–22)
CALCIUM ALBUM COR SERPL-MCNC: 8.9 MG/DL (ref 8.5–10.5)
CALCIUM SERPL-MCNC: 9 MG/DL (ref 8.5–10.5)
CHLORIDE SERPL-SCNC: 97 MMOL/L (ref 96–112)
CHOLEST SERPL-MCNC: 160 MG/DL (ref 100–199)
CO2 SERPL-SCNC: 23 MMOL/L (ref 20–33)
CREAT SERPL-MCNC: 1.39 MG/DL (ref 0.5–1.4)
EOSINOPHIL # BLD AUTO: 0.09 K/UL (ref 0–0.51)
EOSINOPHIL NFR BLD: 1 % (ref 0–6.9)
ERYTHROCYTE [DISTWIDTH] IN BLOOD BY AUTOMATED COUNT: 52.4 FL (ref 35.9–50)
GFR SERPLBLD CREATININE-BSD FMLA CKD-EPI: 39 ML/MIN/1.73 M 2
GLOBULIN SER CALC-MCNC: 3.2 G/DL (ref 1.9–3.5)
GLUCOSE SERPL-MCNC: 107 MG/DL (ref 65–99)
HCT VFR BLD AUTO: 37.4 % (ref 37–47)
HDLC SERPL-MCNC: 72 MG/DL
HGB BLD-MCNC: 12.5 G/DL (ref 12–16)
IMM GRANULOCYTES # BLD AUTO: 0.06 K/UL (ref 0–0.11)
IMM GRANULOCYTES NFR BLD AUTO: 0.7 % (ref 0–0.9)
LDLC SERPL CALC-MCNC: 76 MG/DL
LYMPHOCYTES # BLD AUTO: 1.57 K/UL (ref 1–4.8)
LYMPHOCYTES NFR BLD: 18.2 % (ref 22–41)
MCH RBC QN AUTO: 30.7 PG (ref 27–33)
MCHC RBC AUTO-ENTMCNC: 33.4 G/DL (ref 32.2–35.5)
MCV RBC AUTO: 91.9 FL (ref 81.4–97.8)
MONOCYTES # BLD AUTO: 0.61 K/UL (ref 0–0.85)
MONOCYTES NFR BLD AUTO: 7.1 % (ref 0–13.4)
NEUTROPHILS # BLD AUTO: 6.25 K/UL (ref 1.82–7.42)
NEUTROPHILS NFR BLD: 72.5 % (ref 44–72)
NRBC # BLD AUTO: 0 K/UL
NRBC BLD-RTO: 0 /100 WBC (ref 0–0.2)
PLATELET # BLD AUTO: 220 K/UL (ref 164–446)
PMV BLD AUTO: 11.7 FL (ref 9–12.9)
POTASSIUM SERPL-SCNC: 4.6 MMOL/L (ref 3.6–5.5)
PROT SERPL-MCNC: 7.3 G/DL (ref 6–8.2)
RBC # BLD AUTO: 4.07 M/UL (ref 4.2–5.4)
SODIUM SERPL-SCNC: 132 MMOL/L (ref 135–145)
TRIGL SERPL-MCNC: 60 MG/DL (ref 0–149)
TSH SERPL DL<=0.005 MIU/L-ACNC: 0.88 UIU/ML (ref 0.38–5.33)
WBC # BLD AUTO: 8.6 K/UL (ref 4.8–10.8)

## 2023-10-13 PROCEDURE — 84443 ASSAY THYROID STIM HORMONE: CPT

## 2023-10-13 PROCEDURE — 36415 COLL VENOUS BLD VENIPUNCTURE: CPT

## 2023-10-13 PROCEDURE — 82306 VITAMIN D 25 HYDROXY: CPT

## 2023-10-13 PROCEDURE — 80061 LIPID PANEL: CPT

## 2023-10-13 PROCEDURE — 85025 COMPLETE CBC W/AUTO DIFF WBC: CPT

## 2023-10-13 PROCEDURE — 80053 COMPREHEN METABOLIC PANEL: CPT

## 2023-11-15 ENCOUNTER — HOSPITAL ENCOUNTER (OUTPATIENT)
Dept: RADIOLOGY | Facility: MEDICAL CENTER | Age: 78
End: 2023-11-15
Attending: INTERNAL MEDICINE
Payer: MEDICARE

## 2023-11-15 DIAGNOSIS — K92.1 HEMATOCHEZIA: ICD-10-CM

## 2023-11-15 PROCEDURE — 700117 HCHG RX CONTRAST REV CODE 255: Performed by: INTERNAL MEDICINE

## 2023-11-15 PROCEDURE — 71260 CT THORAX DX C+: CPT

## 2023-11-15 RX ADMIN — IOHEXOL 25 ML: 240 INJECTION, SOLUTION INTRATHECAL; INTRAVASCULAR; INTRAVENOUS; ORAL at 18:56

## 2023-11-15 RX ADMIN — IOHEXOL 100 ML: 350 INJECTION, SOLUTION INTRAVENOUS at 18:57

## 2023-11-16 ENCOUNTER — HOSPITAL ENCOUNTER (OUTPATIENT)
Dept: LAB | Facility: MEDICAL CENTER | Age: 78
End: 2023-11-16
Attending: INTERNAL MEDICINE
Payer: MEDICARE

## 2023-11-16 ENCOUNTER — HOSPITAL ENCOUNTER (OUTPATIENT)
Facility: MEDICAL CENTER | Age: 78
End: 2023-11-16
Attending: FAMILY MEDICINE
Payer: MEDICARE

## 2023-11-16 LAB
ALBUMIN SERPL BCP-MCNC: 3.9 G/DL (ref 3.2–4.9)
ALBUMIN/GLOB SERPL: 1.3 G/DL
ALP SERPL-CCNC: 78 U/L (ref 30–99)
ALT SERPL-CCNC: 16 U/L (ref 2–50)
ANION GAP SERPL CALC-SCNC: 11 MMOL/L (ref 7–16)
AST SERPL-CCNC: 12 U/L (ref 12–45)
BASOPHILS # BLD AUTO: 0.4 % (ref 0–1.8)
BASOPHILS # BLD: 0.03 K/UL (ref 0–0.12)
BILIRUB SERPL-MCNC: 0.6 MG/DL (ref 0.1–1.5)
BUN SERPL-MCNC: 22 MG/DL (ref 8–22)
CALCIUM ALBUM COR SERPL-MCNC: 9.2 MG/DL (ref 8.5–10.5)
CALCIUM SERPL-MCNC: 9.1 MG/DL (ref 8.5–10.5)
CEA SERPL-MCNC: 4 NG/ML (ref 0–3)
CHLORIDE SERPL-SCNC: 88 MMOL/L (ref 96–112)
CO2 SERPL-SCNC: 25 MMOL/L (ref 20–33)
CREAT SERPL-MCNC: 1.28 MG/DL (ref 0.5–1.4)
EOSINOPHIL # BLD AUTO: 0.1 K/UL (ref 0–0.51)
EOSINOPHIL NFR BLD: 1.3 % (ref 0–6.9)
ERYTHROCYTE [DISTWIDTH] IN BLOOD BY AUTOMATED COUNT: 47.6 FL (ref 35.9–50)
GFR SERPLBLD CREATININE-BSD FMLA CKD-EPI: 43 ML/MIN/1.73 M 2
GLOBULIN SER CALC-MCNC: 3.1 G/DL (ref 1.9–3.5)
GLUCOSE SERPL-MCNC: 102 MG/DL (ref 65–99)
HCT VFR BLD AUTO: 36.1 % (ref 37–47)
HGB BLD-MCNC: 12.1 G/DL (ref 12–16)
IMM GRANULOCYTES # BLD AUTO: 0.03 K/UL (ref 0–0.11)
IMM GRANULOCYTES NFR BLD AUTO: 0.4 % (ref 0–0.9)
IRON SERPL-MCNC: 88 UG/DL (ref 40–170)
LYMPHOCYTES # BLD AUTO: 1.48 K/UL (ref 1–4.8)
LYMPHOCYTES NFR BLD: 19.5 % (ref 22–41)
MCH RBC QN AUTO: 30.5 PG (ref 27–33)
MCHC RBC AUTO-ENTMCNC: 33.5 G/DL (ref 32.2–35.5)
MCV RBC AUTO: 90.9 FL (ref 81.4–97.8)
MONOCYTES # BLD AUTO: 0.72 K/UL (ref 0–0.85)
MONOCYTES NFR BLD AUTO: 9.5 % (ref 0–13.4)
NEUTROPHILS # BLD AUTO: 5.23 K/UL (ref 1.82–7.42)
NEUTROPHILS NFR BLD: 68.9 % (ref 44–72)
NRBC # BLD AUTO: 0 K/UL
NRBC BLD-RTO: 0 /100 WBC (ref 0–0.2)
PLATELET # BLD AUTO: 256 K/UL (ref 164–446)
PMV BLD AUTO: 10.4 FL (ref 9–12.9)
POTASSIUM SERPL-SCNC: 3.6 MMOL/L (ref 3.6–5.5)
PROT SERPL-MCNC: 7 G/DL (ref 6–8.2)
RBC # BLD AUTO: 3.97 M/UL (ref 4.2–5.4)
SODIUM SERPL-SCNC: 124 MMOL/L (ref 135–145)
WBC # BLD AUTO: 7.6 K/UL (ref 4.8–10.8)

## 2023-11-16 PROCEDURE — 82607 VITAMIN B-12: CPT

## 2023-11-16 PROCEDURE — 36415 COLL VENOUS BLD VENIPUNCTURE: CPT

## 2023-11-16 PROCEDURE — 80053 COMPREHEN METABOLIC PANEL: CPT

## 2023-11-16 PROCEDURE — 82378 CARCINOEMBRYONIC ANTIGEN: CPT | Mod: GA

## 2023-11-16 PROCEDURE — 85025 COMPLETE CBC W/AUTO DIFF WBC: CPT

## 2023-11-16 PROCEDURE — 83540 ASSAY OF IRON: CPT

## 2023-11-17 LAB — VIT B12 SERPL-MCNC: 3277 PG/ML (ref 211–911)

## 2023-12-15 ENCOUNTER — HOSPITAL ENCOUNTER (OUTPATIENT)
Dept: RADIOLOGY | Facility: MEDICAL CENTER | Age: 78
End: 2023-12-15
Attending: FAMILY MEDICINE
Payer: MEDICARE

## 2023-12-15 DIAGNOSIS — M81.0 SENILE OSTEOPOROSIS: ICD-10-CM

## 2023-12-15 DIAGNOSIS — Z12.31 VISIT FOR SCREENING MAMMOGRAM: ICD-10-CM

## 2023-12-15 PROCEDURE — 77080 DXA BONE DENSITY AXIAL: CPT

## 2023-12-15 PROCEDURE — 77063 BREAST TOMOSYNTHESIS BI: CPT

## 2023-12-18 ENCOUNTER — PATIENT OUTREACH (OUTPATIENT)
Dept: ONCOLOGY | Facility: MEDICAL CENTER | Age: 78
End: 2023-12-18
Payer: MEDICARE

## 2023-12-19 ENCOUNTER — PATIENT OUTREACH (OUTPATIENT)
Dept: ONCOLOGY | Facility: MEDICAL CENTER | Age: 78
End: 2023-12-19
Payer: MEDICARE

## 2023-12-19 ENCOUNTER — HOSPITAL ENCOUNTER (OUTPATIENT)
Dept: RADIOLOGY | Facility: MEDICAL CENTER | Age: 78
End: 2023-12-19
Attending: SURGERY
Payer: MEDICARE

## 2023-12-19 ENCOUNTER — HOSPITAL ENCOUNTER (OUTPATIENT)
Facility: MEDICAL CENTER | Age: 78
End: 2023-12-19
Attending: SURGERY
Payer: MEDICARE

## 2023-12-19 DIAGNOSIS — C20 MALIGNANT NEOPLASM OF RECTUM (HCC): ICD-10-CM

## 2023-12-19 LAB — PATHOLOGY CONSULT NOTE: NORMAL

## 2023-12-19 PROCEDURE — 700111 HCHG RX REV CODE 636 W/ 250 OVERRIDE (IP): Mod: JZ,JG | Performed by: RADIOLOGY

## 2023-12-19 PROCEDURE — 700117 HCHG RX CONTRAST REV CODE 255: Performed by: SURGERY

## 2023-12-19 PROCEDURE — A9579 GAD-BASE MR CONTRAST NOS,1ML: HCPCS | Performed by: SURGERY

## 2023-12-19 PROCEDURE — 72197 MRI PELVIS W/O & W/DYE: CPT

## 2023-12-19 RX ADMIN — GLUCAGON 1 MG: 1 INJECTION, POWDER, LYOPHILIZED, FOR SOLUTION INTRAMUSCULAR; INTRAVENOUS at 09:20

## 2023-12-19 RX ADMIN — GADOTERIDOL 20 ML: 279.3 INJECTION, SOLUTION INTRAVENOUS at 10:41

## 2023-12-19 NOTE — PROGRESS NOTES
"MRI NURSING NOTES:    Pt states tourniquet was too tight, despite loosening it twice.  Pt cried during IV start and advancement of catheter.  Wrapped PIV L hand of coban to promote stability due to pt's loose skin.  She states coban was also too tight.  She felt better once loosened.      Earplugs placed and pt complained they \"also hurt\"      Once Glucagon given slowly via IVP, pt had shivers.  Warm blankets provided: 114/84, 67. 97/RA.  She \"felt better\" and shivering stopped. She stated she \"would like to continue with MRI.\"  Dr Gutierrez  (Rad) notified of events and pt's vitals.  Verbalized understanding.  No new order received form Rad.  Pt given instructions to squeeze call light ball given to her for any issue/concern/discomfort.  Pt verbalized understanding.        "

## 2023-12-19 NOTE — PROGRESS NOTES
"First call placed to patient to introduce self and assess. Kaia answers and speaks to ONN for some time. Kaia had an MRI rectal protocol today with self-sedation. She states it ended up being okay and that she fell asleep even! Kaia states she doesn't have follow up with Dr. Matamoros scheduled yet but that he was going to review the MRI and present her at tumor board for discussion. Patient has been referred to Dr. Basilio at [facility] and Dr. Smiley at [facility]. Kaia has left messages at these offices but hasn't been able to connect with them on the phone yet. Kaia is still working and has clients often, preventing her from answering her phone at times.   ONN will reach out to Dr. Matamoros's office first to see about follow up.   Kaia expressed, tearfully, that she has had some \"unnecessary anxieties\" already in regards to logistics and coordination of care. ONN will do her best to assist the healthcare team to get the best result possible for this patient's care.   "

## 2023-12-20 ENCOUNTER — PATIENT OUTREACH (OUTPATIENT)
Dept: ONCOLOGY | Facility: MEDICAL CENTER | Age: 78
End: 2023-12-20
Payer: MEDICARE

## 2023-12-20 NOTE — PROGRESS NOTES
Received call back from Kaila at Dr. Matamoros's office. Kaia is presurgery. Kaila sees all the orders and referrals for pt. BABAR updated her that Kaia completed her MRI rectal yesterday. Plan to read and then present at tumor board. Patient was not presented at today's tumor board and Dr. Matamoros will be [place] next Wednesday for that conference, and in [facility] on the following week. Scheduled a follow up with Dr. Matamoros to establish a POC for patient on January 8th, 2024 @ 1000.

## 2023-12-22 ENCOUNTER — PATIENT OUTREACH (OUTPATIENT)
Dept: ONCOLOGY | Facility: MEDICAL CENTER | Age: 78
End: 2023-12-22
Payer: MEDICARE

## 2023-12-22 NOTE — PROGRESS NOTES
Call placed to Kaia to update on pre-surgical planning visit with Dr. Matamoros scheduled for 1/8/24 @ 1000. Left detailed VM for Kaia as she did not answer at this time. Will await patient call back and make adjustments accordingly.

## 2024-01-04 ENCOUNTER — PATIENT OUTREACH (OUTPATIENT)
Dept: ONCOLOGY | Facility: MEDICAL CENTER | Age: 79
End: 2024-01-04
Payer: MEDICARE

## 2024-01-10 ENCOUNTER — PATIENT OUTREACH (OUTPATIENT)
Dept: ONCOLOGY | Facility: MEDICAL CENTER | Age: 79
End: 2024-01-10
Payer: MEDICARE

## 2024-01-10 NOTE — PROGRESS NOTES
Call placed to patient to check in. Kaia answers, but cannot talk as she is working. She states all is well. Call was ended. Pt met with Dr. Matamoros pre-surgically and planned for low anterior resection, laparoscopically on 2/8/24 @ 11:00 pm. Follow up PO scheduled 2/26/24 @ 9:45 am.

## 2024-01-18 ENCOUNTER — PATIENT OUTREACH (OUTPATIENT)
Dept: ONCOLOGY | Facility: MEDICAL CENTER | Age: 79
End: 2024-01-18
Payer: MEDICARE

## 2024-01-18 NOTE — PROGRESS NOTES
Returned call from Kaila at [facility]. Willing to speak to patient Kaia about pre-surgical time-out that is done by Porsha and Dr. Matamoros on surgical day. Will provide Kaia with call back number of [phone].

## 2024-01-18 NOTE — PROGRESS NOTES
"Call received from patient Kaia this morning. She wanted to ask Dr. Matamoros (and forgot to at the pre-surgical appt) about \"conciousness monitoring\" during surgery. 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 ONN will contact Dr. Matamoros's office to speak to nurse about this.  Kaia also asked about \"impact advanced recovery\" shakes to take pre and post surgery. ONN unaware of this product but will review with dietician. JEREMY Conley stated that [employer] does endorse this product and we may have samples. ONN recommended [employer] drink also - dietician may have samples and or coupon available too. However, Kaia states its hard for her to get in and out of [employer] and will buy the 2 types of drinks online to try.   "

## 2024-01-26 ENCOUNTER — PATIENT OUTREACH (OUTPATIENT)
Dept: ONCOLOGY | Facility: MEDICAL CENTER | Age: 79
End: 2024-01-26
Payer: MEDICARE

## 2024-01-26 NOTE — PROGRESS NOTES
Kaia calls ONN to ask for help getting prescription for 2 meds called into [facility] for her. She states metronidazole and neomycin are listed in her after visit summary for her to take/use before surgery on 2/8/24. ONN reached out to Dr. Matamoros's office for RX assistance.    Kaila returned ONN call. She had called in the 2 meds to [facility] and updated Kaia. ONN called Kaia to provide community resources she requested on previous call for her friend. ONN will email patient at her gmail address.

## 2024-02-02 ENCOUNTER — PATIENT OUTREACH (OUTPATIENT)
Dept: ONCOLOGY | Facility: MEDICAL CENTER | Age: 79
End: 2024-02-02
Payer: MEDICARE

## 2024-03-05 ENCOUNTER — PATIENT OUTREACH (OUTPATIENT)
Dept: ONCOLOGY | Facility: MEDICAL CENTER | Age: 79
End: 2024-03-05
Payer: MEDICARE

## 2024-03-05 NOTE — PROGRESS NOTES
"Call placed to pt to check in. She answers but was \"with a client\" working. She will call ONN back tomorrow.  "

## 2024-03-25 ENCOUNTER — PATIENT OUTREACH (OUTPATIENT)
Dept: ONCOLOGY | Facility: MEDICAL CENTER | Age: 79
End: 2024-03-25
Payer: MEDICARE

## 2024-03-25 NOTE — PROGRESS NOTES
Call received from Kaia asking for some guidance. She reports that the last time she spoke to Dr. Matamoros he recommended chemotherapy and radiation. She was tearful as she feels like the story has changed somewhat for her and she keeps getting different opinions from providers. Kaia states she was referred to [facility] and they told her that her tumor is too small and for several reasons, radiation therapy was not recommended for her. This Rad Onc also stated that chemotherapy was also unnecessary. Kaia has at least had a consult within [facility] (she had sought care with Dr. Basilio initially) The Oncologist Kaia ended up seeing was not her however. Kaia does not desire going back to [facility] for care or even their opinion because she has not had good customer experiences there.   ONN suggested to Kaia that she would reach out to Dr. Matamoros and find out if he truly feels that chemo and/or radiation be given to Kaia that he refer her within [facility] to one of our providers to discuss her scans and the possible treatments that are appropriate for her. Then we can get her a rad onc referral too if need be. Kaia very appreciative of ONN guidance and support. Will work on this matter and reach out to Kaia to update.

## 2024-03-26 NOTE — PROGRESS NOTES
"Messaged Dr. Matamoros for referral to [facility]. He was unable to read message or reply. ON asked Baylee Suárez [facility] practice manager about \"self-referring\" patient Kaia to [facility]. She agreed to help with this. ONN emailed Kaia an FABIOLA to complete to access records/notes from [facility].     Called Kaia this am and she did not receive said sent email. ONN sent it again at time of call. Kaia was unable to locate in in her inboxs. Kaia provided ONN with her fax number. FABIOLA was faxed to Kaia for completion and then she will fax it back to ON.    [facility] received signed FABIOLA from Kaia. ONN faxed FABIOLA and request for records to [facility].  "

## 2024-04-15 ENCOUNTER — PATIENT OUTREACH (OUTPATIENT)
Dept: ONCOLOGY | Facility: MEDICAL CENTER | Age: 79
End: 2024-04-15
Payer: MEDICARE

## 2024-04-16 NOTE — PROGRESS NOTES
Received a VM from Kaia yesterday around 3pm with questions regarding pathology reports, size of tumor, CME levels, etc. Able to find [facility] report and called to see how patient can obtain this PHI. Patient able to go online to [facility] [employer] and submit a request for records. Also a patient should be able to obtain the report from the MD.   BABAR called Kaia this am to discuss concerns. Since Kaia does not have further follow up planned with Dr. Matamoros, BABAR suggested asking Dr. Culp all these questions tomorrow at the consult. She agrees with this plan. Patient wants to make the best decision on whether she needs chemo and should do it. BABAR discussed that Dr. Culp's job is to know all the details of a person's ds and suggest the best course of action for a patient. She is not alone! BABAR could sense her nervousness. BABAR will plan to meet Kaia at 8 am to support her through her consult. Kaia was tearful and very grateful. She said she asked her cousin to come with her but she is unable.

## 2024-04-17 ENCOUNTER — HOSPITAL ENCOUNTER (OUTPATIENT)
Dept: HEMATOLOGY ONCOLOGY | Facility: MEDICAL CENTER | Age: 79
End: 2024-04-17
Attending: STUDENT IN AN ORGANIZED HEALTH CARE EDUCATION/TRAINING PROGRAM
Payer: MEDICARE

## 2024-04-17 ENCOUNTER — PATIENT OUTREACH (OUTPATIENT)
Dept: ONCOLOGY | Facility: MEDICAL CENTER | Age: 79
End: 2024-04-17

## 2024-04-17 VITALS
RESPIRATION RATE: 16 BRPM | OXYGEN SATURATION: 96 % | SYSTOLIC BLOOD PRESSURE: 132 MMHG | HEART RATE: 72 BPM | HEIGHT: 65 IN | BODY MASS INDEX: 35.69 KG/M2 | WEIGHT: 214.2 LBS | TEMPERATURE: 98.8 F | DIASTOLIC BLOOD PRESSURE: 78 MMHG

## 2024-04-17 DIAGNOSIS — C20 RECTAL ADENOCARCINOMA (HCC): ICD-10-CM

## 2024-04-17 PROCEDURE — 99212 OFFICE O/P EST SF 10 MIN: CPT | Performed by: STUDENT IN AN ORGANIZED HEALTH CARE EDUCATION/TRAINING PROGRAM

## 2024-04-17 PROCEDURE — 99205 OFFICE O/P NEW HI 60 MIN: CPT | Performed by: STUDENT IN AN ORGANIZED HEALTH CARE EDUCATION/TRAINING PROGRAM

## 2024-04-17 RX ORDER — TRAZODONE HYDROCHLORIDE 50 MG/1
50 TABLET ORAL
COMMUNITY

## 2024-04-17 RX ORDER — CLONAZEPAM 0.5 MG/1
TABLET ORAL
COMMUNITY

## 2024-04-17 RX ORDER — BUPROPION HYDROCHLORIDE 150 MG/1
TABLET, EXTENDED RELEASE ORAL
COMMUNITY

## 2024-04-17 RX ORDER — VALSARTAN AND HYDROCHLOROTHIAZIDE 320; 25 MG/1; MG/1
1 TABLET, FILM COATED ORAL DAILY
COMMUNITY

## 2024-04-17 ASSESSMENT — ENCOUNTER SYMPTOMS
PALPITATIONS: 0
ORTHOPNEA: 0
HEADACHES: 0
WEAKNESS: 0
CONSTIPATION: 0
MYALGIAS: 0
NAUSEA: 0
HEARTBURN: 0
INSOMNIA: 0
ABDOMINAL PAIN: 0
CHILLS: 0
TINGLING: 0
SORE THROAT: 0
FEVER: 0
DIAPHORESIS: 0
COUGH: 0
DOUBLE VISION: 0
SPUTUM PRODUCTION: 0
VOMITING: 0
WEIGHT LOSS: 0
SINUS PAIN: 0
BLURRED VISION: 0
DIZZINESS: 0
BLOOD IN STOOL: 0
DIARRHEA: 0
WHEEZING: 0
BRUISES/BLEEDS EASILY: 0
BACK PAIN: 0
NERVOUS/ANXIOUS: 0
SHORTNESS OF BREATH: 0

## 2024-04-17 ASSESSMENT — PAIN SCALES - GENERAL: PAINLEVEL: NO PAIN

## 2024-04-17 ASSESSMENT — FIBROSIS 4 INDEX: FIB4 SCORE: 0.9

## 2024-04-17 NOTE — ADDENDUM NOTE
Encounter addended by: Luiz Culp D.O. on: 4/17/2024 10:35 AM   Actions taken: Order list changed, Diagnosis association updated

## 2024-04-17 NOTE — ADDENDUM NOTE
Encounter addended by: Mayra Mayer, Med Ass't on: 4/17/2024 1:22 PM   Actions taken: Charge Capture section accepted

## 2024-04-17 NOTE — PROGRESS NOTES
Consult:  Hematology/Oncology      Referring Physician: PCP  Primary Care:  Elian Torres M.D.    Diagnosis: Rectal/sigmoid adenocarcinoma    Chief Complaint:  Transfer of care, surveillance visit    History of Presenting Illness:  Kaia Sorto is a 79 y.o.  woman who presents to the clinic for evaluation for systemic therapy for diagnosis of adenocarcinoma of the rectosigmoid area.  The patient was found on colonoscopy to have a lesion 15 to 18 cm from the anus in November 2023.  She had a rectal MRI which showed a potential T1 or T2 lesion, with no lymph nodes.  She was seen by colorectal surgery and was referred for evaluation for potential radiation therapy as well as chemotherapy.  She was unable to get scheduled with medical oncology at that time, and then ultimately ended up going for surgery alone in February 2024.  The final pathology reports showed a pT3 N0 M0 stage IIa disease.  She was subsequently referred to me for evaluation for therapy.    Interval History:  Patient is here for consultation.  She is feeling fine and has no complaints at this time.  She has a lot of hesitation about doing chemotherapy.    Past Medical History:   Diagnosis Date    Arthritis     wrists    Chickenpox     Depression     Disorder of thyroid     Hypertension     Hypothyroidism     Influenza     Mumps     Sleep apnea     Snoring     Tonsillitis        Past Surgical History:   Procedure Laterality Date    CARPAL TUNNEL ENDOSCOPIC Right 11/3/2016    Procedure: ENDOSCOPIC CARPAL TUNNEL RELEASE;  Surgeon: Honorio Pritchard M.D.;  Location: SURGERY [facility];  Service:     OTHER ABDOMINAL SURGERY  2006    cholecystectomy    OTHER ORTHOPEDIC SURGERY Left 2005    rotator cuff repair    OTHER ORTHOPEDIC SURGERY Right 1978    rt shoulder dislocation repair    CARPAL TUNNEL RELEASE      CHOLECYSTECTOMY      OTHER      bilateral cataracts removed       Social History     Socioeconomic History    Marital status:  Single     Spouse name: Not on file    Number of children: Not on file    Years of education: Not on file    Highest education level: Not on file   Occupational History    Not on file   Tobacco Use    Smoking status: Former     Current packs/day: 0.00     Average packs/day: 0.3 packs/day for 10.0 years (2.5 ttl pk-yrs)     Types: Cigarettes     Start date: 1971     Quit date: 1981     Years since quittin.3    Smokeless tobacco: Never   Substance and Sexual Activity    Alcohol use: No     Alcohol/week: 0.0 - 0.6 oz     Comment: Occ    Drug use: No    Sexual activity: Not on file   Other Topics Concern    Not on file   Social History Narrative    Not on file     Social Determinants of Health     Financial Resource Strain: Not on file   Food Insecurity: Not on file   Transportation Needs: Not on file   Physical Activity: Not on file   Stress: Not on file   Social Connections: Not on file   Intimate Partner Violence: Not on file   Housing Stability: Not on file       Family History   Problem Relation Age of Onset    Heart Failure Mother     Heart Disease Father     Sleep Apnea Neg Hx        OB History   No obstetric history on file.       Allergies as of 2024 - Reviewed 2024   Allergen Reaction Noted    Food  2024    Morphine Vomiting 2011         Current Outpatient Medications:     buPROPion SR (WELLBUTRIN-SR) 150 MG TABLET SR 12 HR sustained-release tablet, TAKE 2 TABLETS BY MOUTH IN THE MORNING AND THEN TAKE 1 TABLET IN THE AFTERNOON, Disp: , Rfl:     clonazePAM (KLONOPIN) 0.5 MG Tab, TAKE 1/2 TO 1 (ONE-HALF TO ONE) TABLET BY MOUTH ONCE DAILY AS NEEDED FOR STRESS OR ANXIETY, Disp: , Rfl:     Cholecalciferol 1.25 MG (86409 UT) Tab, Take  by mouth., Disp: , Rfl:     traZODone (DESYREL) 50 MG Tab, Take 50 mg by mouth at bedtime as needed for Sleep., Disp: , Rfl:     valsartan-hydrochlorothiazide (DIOVAN-HCT) 320-25 MG per tablet, Take 1 Tablet by mouth every day., Disp: , Rfl:      Diclofenac Sodium 1 % Gel, Apply 1 Application to skin as directed 3 times a day., Disp: 1 Tube, Rfl: 0    melatonin 3 MG Tab, Take 3 mg by mouth every bedtime., Disp: , Rfl:     levothyroxine (SYNTHROID) 75 MCG Tab, Take 75 mcg by mouth Every morning on an empty stomach., Disp: , Rfl:     predniSONE (DELTASONE) 10 MG Tab, Take 10 mg by mouth every day. (Patient not taking: Reported on 4/17/2024), Disp: , Rfl:     predniSONE (DELTASONE) 20 MG Tab, Take 2 tabs po daily for 5 days. (Patient not taking: Reported on 10/4/2019), Disp: 10 Tab, Rfl: 0    NON SPECIFIED, Currently taking a muscle relaxer (Patient not taking: Reported on 4/17/2024), Disp: , Rfl:     MethylPREDNISolone (MEDROL DOSEPAK) 4 MG Tablet Therapy Pack, follow package directions (Patient not taking: Reported on 10/4/2019), Disp: 21 Tab, Rfl: 0    celecoxib (CELEBREX) 100 MG Cap, Take 1 Cap by mouth 2 times a day. (Patient not taking: Reported on 10/4/2019), Disp: 60 Cap, Rfl: 0    baclofen (LIORESAL) 10 MG Tab, Take 1 Tab by mouth at bedtime as needed. (Patient not taking: Reported on 10/4/2019), Disp: 15 Tab, Rfl: 0    fluticasone (FLONASE) 50 MCG/ACT nasal spray, Spray 2 Sprays in nose every day. (Patient not taking: Reported on 10/4/2019), Disp: 1 Bottle, Rfl: 3    OLMESARTAN-AMLODIPINE-HCTZ PO, Take  by mouth. (Patient not taking: Reported on 4/17/2024), Disp: , Rfl:     Review of Systems:  Review of Systems   Constitutional:  Negative for chills, diaphoresis, fever, malaise/fatigue and weight loss.   HENT:  Negative for hearing loss, nosebleeds, sinus pain and sore throat.    Eyes:  Negative for blurred vision and double vision.   Respiratory:  Negative for cough, sputum production, shortness of breath and wheezing.    Cardiovascular:  Negative for chest pain, palpitations, orthopnea and leg swelling.   Gastrointestinal:  Negative for abdominal pain, blood in stool, constipation, diarrhea, heartburn, melena, nausea and vomiting.   Genitourinary:   "Negative for dysuria, frequency, hematuria and urgency.   Musculoskeletal:  Negative for back pain, joint pain and myalgias.   Skin:  Negative for rash.   Neurological:  Negative for dizziness, tingling, weakness and headaches.   Endo/Heme/Allergies:  Does not bruise/bleed easily.   Psychiatric/Behavioral:  The patient is not nervous/anxious and does not have insomnia.           Physical Exam:  Vitals:    04/17/24 0818   BP: 132/78   Pulse: 72   Resp: 16   Temp: 37.1 °C (98.8 °F)   TempSrc: Temporal   SpO2: 96%   Weight: 97.2 kg (214 lb 3.2 oz)   Height: 1.651 m (5' 5\")       DESC; KARNOFSKY SCALE WITH ECOG EQUIVALENT: 100, Fully active, able to carry on all pre-disease performed without restriction (ECOG equivalent 0)    DISTRESS LEVEL: no apparent distress    Physical Exam  Vitals and nursing note reviewed.   Constitutional:       General: She is awake. She is not in acute distress.     Appearance: Normal appearance. She is normal weight. She is not ill-appearing, toxic-appearing or diaphoretic.   HENT:      Head: Normocephalic and atraumatic.      Nose: Nose normal. No congestion.      Mouth/Throat:      Pharynx: Oropharynx is clear. No oropharyngeal exudate or posterior oropharyngeal erythema.   Eyes:      General: No scleral icterus.     Extraocular Movements: Extraocular movements intact.      Conjunctiva/sclera: Conjunctivae normal.      Pupils: Pupils are equal, round, and reactive to light.   Cardiovascular:      Rate and Rhythm: Normal rate and regular rhythm.      Pulses: Normal pulses.      Heart sounds: Murmur heard.      Systolic murmur is present with a grade of 2/6.      No friction rub. No gallop.   Pulmonary:      Effort: Pulmonary effort is normal.      Breath sounds: Normal breath sounds. No decreased air movement. No wheezing, rhonchi or rales.   Abdominal:      General: Bowel sounds are normal. There is no distension.      Tenderness: There is no abdominal tenderness.   Musculoskeletal:         " General: No deformity. Normal range of motion.      Cervical back: Normal range of motion and neck supple. No tenderness.      Right lower leg: No edema.      Left lower leg: No edema.   Lymphadenopathy:      Cervical: No cervical adenopathy.      Upper Body:      Right upper body: No axillary adenopathy.      Left upper body: No axillary adenopathy.      Lower Body: No right inguinal adenopathy. No left inguinal adenopathy.   Skin:     General: Skin is warm and dry.      Coloration: Skin is not jaundiced.      Findings: No erythema or rash.   Neurological:      General: No focal deficit present.      Mental Status: She is alert and oriented to person, place, and time.      Sensory: Sensation is intact.      Motor: Motor function is intact. No weakness.      Gait: Gait is intact.   Psychiatric:         Attention and Perception: Attention normal.         Mood and Affect: Mood normal.         Behavior: Behavior normal. Behavior is cooperative.         Thought Content: Thought content normal.         Judgment: Judgment normal.          Depression Screening    Little interest or pleasure in doing things?      Feeling down, depressed , or hopeless?     Trouble falling or staying asleep, or sleeping too much?      Feeling tired or having little energy?      Poor appetite or overeating?      Feeling bad about yourself - or that you are a failure or have let yourself or your family down?     Trouble concentrating on things, such as reading the newspaper or watching television?     Moving or speaking so slowly that other people could have noticed.  Or the opposite - being so fidgety or restless that you have been moving around a lot more than usual?      Thoughts that you would be better off dead, or of hurting yourself?      Patient Health Questionnaire Score:         If depressive symptoms identified deferred to follow up visit unless specifically addressed in assesment and plan.    Interpretation of PHQ-9 Total Score    Score Severity   1-4 No Depression   5-9 Mild Depression   10-14 Moderate Depression   15-19 Moderately Severe Depression   20-27 Severe Depression    Labs:  No visits with results within 7 Day(s) from this visit.   Latest known visit with results is:   Hospital Outpatient Visit on 12/19/2023   Component Date Value Ref Range Status    Pathology Request 12/19/2023 Sent to [facility]   Final       Imaging:   All listed images below have been independently reviewed by me. I agree with the findings as summarized below:    No results found.     Pathology:  Reported [id number] stage II-a disease (adenocarcinoma, MSI-S)    Assessment & Plan:  1. Rectal adenocarcinoma (HCC)            This is a 79 year old  woman with adenocarcinoma of the rectosigmoid colon, s/p resection in 02/2024. She presents for evaluation.     Current Diagnosis and Staging: Adenocarcinoma of the rectosigmoid colon, [id number] stage II-a disease, MSI-S    Treatment Plan: Will check ctDNA. If negative, will proceed with surveillance. If positive, will plan for adjuvant FOLFOX.     Treatment Citation: NCCN; PROSPECT trial, [facility] 2023    Plan of Care:    Primary Therapy: TBD - surveillance vs FOLFOX  Supportive Therapy: Medical management per primary  Toxicity: Patient is getting antineoplastic therapy and needs monitoring of blood counts, hepatic function, and renal function due to potential for organ dysfunction.   Labs: CBC with diff, CMP, CEA monitoring. ctDNA monitoring.   Imaging: CT c/a/p due 08/2024   Treatment Planning: Patient has reported stage II-a disease, which was potentially more sigmoid than rectal adenocarcinoma. Given this finding, she may not need chemotherapy. Assess ctDNA and if negative, will proceed with surveillance alone. If positive, plan for FOLFOX. Will also need to assess pathology report in full.   Consultations: Colorectal surgery (Dr. Matamoros)  Code Status: Full  Miscellaneous: NA  Return for Follow Up: 3 months or sooner  as clinically indicated    Any questions and concerns raised by the patient were answered to the best of my ability. Thank you for allowing me to participate in the care for this patient. Please feel free to contact me for any questions or concerns.     Total time spent on chart review, clinic encounter, and documentation: 61 minutes.

## 2024-04-17 NOTE — PROGRESS NOTES
BABAR met Kaia in the [facility] lobby for her new pt consult with Dr. Culp today. BABAR able to help clarify details and provide emotional support. Kaia was tearful off and on during the visit re: thinking about chemo and her son passed away 1 year ago. At this time, we think Kaia will not require chemotherapy. Blood was drawn for cTDNA. F/u set up for 6 weeks to go over result and POC, 5/20. After visit, BABAR walked Kaia to [facility] on [address] [place] [address].

## 2024-05-20 ENCOUNTER — APPOINTMENT (OUTPATIENT)
Dept: HEMATOLOGY ONCOLOGY | Facility: MEDICAL CENTER | Age: 79
End: 2024-05-20
Payer: MEDICARE

## 2024-05-30 ENCOUNTER — HOSPITAL ENCOUNTER (OUTPATIENT)
Dept: HEMATOLOGY ONCOLOGY | Facility: MEDICAL CENTER | Age: 79
End: 2024-05-30
Attending: STUDENT IN AN ORGANIZED HEALTH CARE EDUCATION/TRAINING PROGRAM
Payer: MEDICARE

## 2024-05-30 VITALS
HEIGHT: 65 IN | OXYGEN SATURATION: 94 % | RESPIRATION RATE: 16 BRPM | WEIGHT: 217.6 LBS | TEMPERATURE: 98.8 F | SYSTOLIC BLOOD PRESSURE: 126 MMHG | HEART RATE: 70 BPM | BODY MASS INDEX: 36.25 KG/M2 | DIASTOLIC BLOOD PRESSURE: 84 MMHG

## 2024-05-30 DIAGNOSIS — C20 RECTAL ADENOCARCINOMA (HCC): ICD-10-CM

## 2024-05-30 ASSESSMENT — ENCOUNTER SYMPTOMS
BRUISES/BLEEDS EASILY: 0
NAUSEA: 0
MYALGIAS: 0
SPUTUM PRODUCTION: 0
ORTHOPNEA: 0
SORE THROAT: 0
CHILLS: 0
SHORTNESS OF BREATH: 0
ABDOMINAL PAIN: 0
PALPITATIONS: 0
WEIGHT LOSS: 0
VOMITING: 0
HEADACHES: 0
BLOOD IN STOOL: 0
WHEEZING: 0
CONSTIPATION: 0
BLURRED VISION: 0
DIAPHORESIS: 0
DOUBLE VISION: 0
SINUS PAIN: 0
COUGH: 0
NERVOUS/ANXIOUS: 0
BACK PAIN: 0
TINGLING: 0
HEARTBURN: 0
DIZZINESS: 0
FEVER: 0
WEAKNESS: 0
DIARRHEA: 0
INSOMNIA: 0

## 2024-05-30 ASSESSMENT — FIBROSIS 4 INDEX: FIB4 SCORE: 0.9

## 2024-05-30 ASSESSMENT — PAIN SCALES - GENERAL: PAINLEVEL: 4=SLIGHT-MODERATE PAIN

## 2024-05-30 NOTE — PROGRESS NOTES
Follow Up Note:  Hematology/Oncology      Primary Care:  Elian Torres M.D.    Diagnosis: Rectal/sigmoid adenocarcinoma    Chief Complaint: Surveillance visit    Current Treatment: Surveillance    Prior Treatment: Surgical resection    Oncology History of Presenting Illness:  Kaia Sorto is a 79 y.o.  woman who presents to the clinic for evaluation for systemic therapy for diagnosis of adenocarcinoma of the rectosigmoid area.  The patient was found on colonoscopy to have a lesion 15 to 18 cm from the anus in November 2023.  She had a rectal MRI which showed a potential T1 or T2 lesion, with no lymph nodes.  She was seen by colorectal surgery and was referred for evaluation for potential radiation therapy as well as chemotherapy.  She was unable to get scheduled with medical oncology at that time, and then ultimately ended up going for surgery alone in February 2024.  The final pathology reports showed a pT3 N0 M0 stage IIa disease.  She was subsequently referred to me for evaluation for therapy.     Treatment History:   02/2024: Surgical resection, [id number] stage II-a disease    Interval History:  Patient is here for follow up visit. She feels well and has no complaints.     Allergies as of 05/30/2024 - Reviewed 05/30/2024   Allergen Reaction Noted    Food  04/17/2024    Morphine Vomiting 12/03/2011         Current Outpatient Medications:     buPROPion SR (WELLBUTRIN-SR) 150 MG TABLET SR 12 HR sustained-release tablet, TAKE 2 TABLETS BY MOUTH IN THE MORNING AND THEN TAKE 1 TABLET IN THE AFTERNOON, Disp: , Rfl:     clonazePAM (KLONOPIN) 0.5 MG Tab, TAKE 1/2 TO 1 (ONE-HALF TO ONE) TABLET BY MOUTH ONCE DAILY AS NEEDED FOR STRESS OR ANXIETY, Disp: , Rfl:     Cholecalciferol 1.25 MG (77485 UT) Tab, Take  by mouth., Disp: , Rfl:     traZODone (DESYREL) 50 MG Tab, Take 50 mg by mouth at bedtime as needed for Sleep., Disp: , Rfl:     valsartan-hydrochlorothiazide (DIOVAN-HCT) 320-25 MG per tablet, Take 1  Tablet by mouth every day., Disp: , Rfl:     Diclofenac Sodium 1 % Gel, Apply 1 Application to skin as directed 3 times a day., Disp: 1 Tube, Rfl: 0    melatonin 3 MG Tab, Take 3 mg by mouth every bedtime., Disp: , Rfl:     levothyroxine (SYNTHROID) 75 MCG Tab, Take 75 mcg by mouth Every morning on an empty stomach., Disp: , Rfl:     predniSONE (DELTASONE) 10 MG Tab, Take 10 mg by mouth every day. (Patient not taking: Reported on 4/17/2024), Disp: , Rfl:     predniSONE (DELTASONE) 20 MG Tab, Take 2 tabs po daily for 5 days. (Patient not taking: Reported on 10/4/2019), Disp: 10 Tab, Rfl: 0    NON SPECIFIED, Currently taking a muscle relaxer (Patient not taking: Reported on 4/17/2024), Disp: , Rfl:     MethylPREDNISolone (MEDROL DOSEPAK) 4 MG Tablet Therapy Pack, follow package directions (Patient not taking: Reported on 10/4/2019), Disp: 21 Tab, Rfl: 0    celecoxib (CELEBREX) 100 MG Cap, Take 1 Cap by mouth 2 times a day. (Patient not taking: Reported on 10/4/2019), Disp: 60 Cap, Rfl: 0    baclofen (LIORESAL) 10 MG Tab, Take 1 Tab by mouth at bedtime as needed. (Patient not taking: Reported on 10/4/2019), Disp: 15 Tab, Rfl: 0    fluticasone (FLONASE) 50 MCG/ACT nasal spray, Spray 2 Sprays in nose every day. (Patient not taking: Reported on 10/4/2019), Disp: 1 Bottle, Rfl: 3    OLMESARTAN-AMLODIPINE-HCTZ PO, Take  by mouth. (Patient not taking: Reported on 4/17/2024), Disp: , Rfl:       Review of Systems:  Review of Systems   Constitutional:  Negative for chills, diaphoresis, fever, malaise/fatigue and weight loss.   HENT:  Negative for hearing loss, nosebleeds, sinus pain and sore throat.    Eyes:  Negative for blurred vision and double vision.   Respiratory:  Negative for cough, sputum production, shortness of breath and wheezing.    Cardiovascular:  Negative for chest pain, palpitations, orthopnea and leg swelling.   Gastrointestinal:  Negative for abdominal pain, blood in stool, constipation, diarrhea, heartburn,  "melena, nausea and vomiting.   Genitourinary:  Negative for dysuria, frequency, hematuria and urgency.   Musculoskeletal:  Negative for back pain, joint pain and myalgias.   Skin:  Negative for rash.   Neurological:  Negative for dizziness, tingling, weakness and headaches.   Endo/Heme/Allergies:  Does not bruise/bleed easily.   Psychiatric/Behavioral:  The patient is not nervous/anxious and does not have insomnia.          Physical Exam:  Vitals:    05/30/24 1021   BP: 126/84   Pulse: 70   Resp: 16   Temp: 37.1 °C (98.8 °F)   TempSrc: Temporal   SpO2: 94%   Weight: 98.7 kg (217 lb 9.6 oz)   Height: 1.651 m (5' 5\")       DESC; KARNOFSKY SCALE WITH ECOG EQUIVALENT: 100, Fully active, able to carry on all pre-disease performed without restriction (ECOG equivalent 0)    DISTRESS LEVEL: no apparent distress    Physical Exam  Vitals and nursing note reviewed.   Constitutional:       General: She is awake. She is not in acute distress.     Appearance: Normal appearance. She is obese. She is not ill-appearing, toxic-appearing or diaphoretic.   HENT:      Head: Normocephalic and atraumatic.      Nose: Nose normal. No congestion.      Mouth/Throat:      Pharynx: Oropharynx is clear. No oropharyngeal exudate or posterior oropharyngeal erythema.   Eyes:      General: No scleral icterus.     Extraocular Movements: Extraocular movements intact.      Conjunctiva/sclera: Conjunctivae normal.      Pupils: Pupils are equal, round, and reactive to light.   Cardiovascular:      Rate and Rhythm: Normal rate and regular rhythm.      Pulses: Normal pulses.      Heart sounds: Normal heart sounds. No murmur heard.     No friction rub. No gallop.   Pulmonary:      Effort: Pulmonary effort is normal.      Breath sounds: Normal breath sounds. No decreased air movement. No wheezing, rhonchi or rales.   Abdominal:      General: Bowel sounds are normal. There is no distension.      Tenderness: There is no abdominal tenderness.   Musculoskeletal: "         General: No deformity. Normal range of motion.      Cervical back: Normal range of motion and neck supple. No tenderness.      Right lower leg: No edema.      Left lower leg: No edema.   Lymphadenopathy:      Cervical: No cervical adenopathy.      Upper Body:      Right upper body: No axillary adenopathy.      Left upper body: No axillary adenopathy.      Lower Body: No right inguinal adenopathy. No left inguinal adenopathy.   Skin:     General: Skin is warm and dry.      Coloration: Skin is not jaundiced.      Findings: No erythema or rash.   Neurological:      General: No focal deficit present.      Mental Status: She is alert and oriented to person, place, and time.      Sensory: Sensation is intact.      Motor: Motor function is intact. No weakness.      Gait: Gait is intact.   Psychiatric:         Attention and Perception: Attention normal.         Mood and Affect: Mood normal.         Behavior: Behavior normal. Behavior is cooperative.         Thought Content: Thought content normal.         Judgment: Judgment normal.           Labs:  No visits with results within 7 Day(s) from this visit.   Latest known visit with results is:   Hospital Outpatient Visit on 12/19/2023   Component Date Value Ref Range Status    Pathology Request 12/19/2023 Sent to [facility]   Final       Imaging:     All listed images below have been independently reviewed by me. I agree with the findings as summarized below:    No results found.    Pathology:  Reported [id number] stage II-a disease (adenocarcinoma, MSI-S)     [facility] 04/18/24: Not detected    Assessment & Plan:  1. Rectal adenocarcinoma (HCC)  CT-CHEST,ABDOMEN,PELVIS WITH    CBC WITH DIFFERENTIAL    Comp Metabolic Panel    CEA        This is a 79 year old  woman with adenocarcinoma of the rectosigmoid colon, s/p resection in 02/2024. She presents for evaluation.      Current Diagnosis and Staging: Adenocarcinoma of the rectosigmoid colon, [id number] stage II-a  disease, MSI-S    Update: [facility] testing revealed no disease. She will proceed with surveillance. Return in 3 months for labs, scans, and exam.      Treatment Plan: Surveillance     Treatment Citation: NCCN     Plan of Care:     Primary Therapy: NA  Supportive Therapy: Medical management per primary  Toxicity: NA   Labs: CBC with diff, CMP, CEA monitoring. ctDNA monitoring.   Imaging: CT c/a/p due 08/2024 (scheduled)  Treatment Planning: Patient has reported stage II-a disease, which was potentially more sigmoid than rectal adenocarcinoma. ctDNA testing was negative, so we will go on surveillance.   Consultations: Colorectal surgery (Dr. Matamoros)  Code Status: Full  Miscellaneous: NA  Return for Follow Up: 3 months    Any questions and concerns raised by the patient were answered to the best of my ability. Thank you for allowing me to participate in the care for this patient. Please feel free to contact me for any questions or concerns.       Total time spent on chart review, clinic encounter, and documentation: 25 minutes.

## 2024-06-10 ENCOUNTER — TELEPHONE (OUTPATIENT)
Dept: HEMATOLOGY ONCOLOGY | Facility: MEDICAL CENTER | Age: 79
End: 2024-06-10
Payer: MEDICARE

## 2024-06-12 ENCOUNTER — TELEPHONE (OUTPATIENT)
Dept: HEMATOLOGY ONCOLOGY | Facility: MEDICAL CENTER | Age: 79
End: 2024-06-12
Payer: MEDICARE

## 2024-06-12 NOTE — TELEPHONE ENCOUNTER
Returned call to pt following discussion with Dr. Culp regarding headaches and LVM advising her to followup with her primary care provider.  Encouraged pt to call back with any questions/concerns.

## 2024-08-20 DIAGNOSIS — C80.1 ANXIETY ASSOCIATED WITH CANCER DIAGNOSIS (HCC): ICD-10-CM

## 2024-08-20 DIAGNOSIS — F41.1 ANXIETY ASSOCIATED WITH CANCER DIAGNOSIS (HCC): ICD-10-CM

## 2024-08-20 RX ORDER — DIAZEPAM 5 MG
TABLET ORAL
Qty: 2 TABLET | Refills: 0 | Status: SHIPPED | OUTPATIENT
Start: 2024-08-20 | End: 2024-08-21

## 2024-08-20 NOTE — PROGRESS NOTES
Requested Prescriptions     Pending Prescriptions Disp Refills    diazePAM (VALIUM) 5 MG Tab 2 Tablet 0     Si tab PO prior to imaging (DO NOT take until consent has been signed at procedure)  May repeat dose after 1 hour if needed     Pt called Medical Oncology requesting medication for upcoming CT scan due to anxiety.  Request routed to Dr. Culp to review.

## 2024-08-21 ENCOUNTER — NON-PROVIDER VISIT (OUTPATIENT)
Dept: INTERNAL MEDICINE | Facility: OTHER | Age: 79
End: 2024-08-21
Payer: MEDICARE

## 2024-08-21 VITALS — HEIGHT: 65 IN | WEIGHT: 219 LBS | BODY MASS INDEX: 36.49 KG/M2

## 2024-08-21 DIAGNOSIS — I10 HYPERTENSION, UNSPECIFIED TYPE: ICD-10-CM

## 2024-08-21 DIAGNOSIS — Z71.3 DIETARY COUNSELING AND SURVEILLANCE: ICD-10-CM

## 2024-08-21 DIAGNOSIS — E66.9 OBESITY (BMI 30-39.9): ICD-10-CM

## 2024-08-21 DIAGNOSIS — N18.32 STAGE 3B CHRONIC KIDNEY DISEASE: ICD-10-CM

## 2024-08-21 DIAGNOSIS — N18.30 STAGE 3 CHRONIC KIDNEY DISEASE, UNSPECIFIED WHETHER STAGE 3A OR 3B CKD: ICD-10-CM

## 2024-08-21 PROCEDURE — 97802 MEDICAL NUTRITION INDIV IN: CPT | Performed by: DIETITIAN, REGISTERED

## 2024-08-21 ASSESSMENT — FIBROSIS 4 INDEX: FIB4 SCORE: 0.9

## 2024-08-21 NOTE — PATIENT INSTRUCTIONS
I will plan to go to the pool more often  - aim for 3/7- call my friends ahead of time  - for my mood  - for my enjoyment    I will increase my veggies  - add more artichokes, 3/7  - add my lean protein   - build my healthy plate

## 2024-08-21 NOTE — PROGRESS NOTES
"Kaia Sorto is a 79 y.o. year old, female initial nutrition evaluation s/p colon/segmental resection for cancer desiring to manage health with liver and kidney concerns.    ROS: recent surgery for rectal cancer; grief from loss of son- March 2023; +COVID in July 2023    Wellness Vision:  I want to lose weight to be comfortable so that my liver, kidneys are in  balance and know what to do to take care of myself.    My Health Profile:    PHYSICAL ASSESSMENT  Current Height:  65\"  Ht Readings from Last 1 Encounters:   05/30/24 1.651 m (5' 5\")     Current Weight:  219#  Wt Readings from Last 1 Encounters:   08/21/24 99.3 kg (219 lb)     BMI:  36    FLUID INTAKE:  water 30 oz  Typical Beverages: 18 oz coffee, diet tea, reg tea  Servings Alcohol/D/WK/MO: none    ACTIVITY REVIEW:   Current Exercise: water walking at home pool, 2/7  Hobbies: social times with friends    PERTINENT LABS: new labs pending    2/12/24:(during hospitalization w/diarrhea)  BUN: 39  Crt: 1.12  Alk Phos: 55  AST: 80   ALT: 81     Latest Reference Range & Units 10/13/23 12:43   Cholesterol,Tot 100 - 199 mg/dL 160   Triglycerides 0 - 149 mg/dL 60   HDL >=40 mg/dL 72   LDL <100 mg/dL 76      Latest Reference Range & Units 10/13/23 12:43 11/16/23 09:09   GFR (CKD-EPI) >60 mL/min/1.73 m 2 39 ! 43 !      Latest Reference Range & Units 10/13/23 12:43   25-Hydroxy   Vitamin D 25 30 - 100 ng/mL 70     Patient Behaviors (indicate frequency)  Meal/Snack Pattern:     B: triple zero [employer] yogurt+nuts trail mix, coffee+lactose free nonfat milk; blueberry scone or  2-whole wheat waffles w/mangoes/sugar free syrup  L: 4/7 will have lunch: 3-cheese omelet+salsa+guacamole 2-3 x/week; or leftovers; turkey cranberry wraps  Snacks: Skinny Pop, 1/2 Blueberry Scone  D: Chicken quesadilla from [employer] restaurant  HS: occ [employer] fudgsicle    Dines away from Home: frequent  Locations:  restaurants, convenience foods  Who lives in home: self  Additional Patient Behavior Information: " difficult to cook for herself    PES: Obesity II, CKD, controlled HTN r/t recent colon surgery, foot pain, frequent processed food intake with moderate dining out, with low intake fiber and microbiome fuel as evidenced by BMI 36, GFR 43    NUTRITION COMMENTS:    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    Plantar fasciitis and bone spur foot problems- cannot stand for long periods of time- 5 years in duration suffering. Received PT but pain continues.    Colon resection: daily stool output mostly firm or diarrhea every two weeks, careful what she eats. Alleviates w/Immodium.    Weight loss efforts: WW but not committed    Challenges: was with  [employer], but when closed stopped consistency.    Sleep: 8 hours, once a night bathroom break    Introduce lifestyle medicine pillars of health with Kaia- ready for new direction.     Reinforced her desire for accountability and bringing herself forward in reducing health risk trends. Obtain new lab work.    RTC x next available    Time Spent: 60 minutes

## 2024-08-30 ENCOUNTER — HOSPITAL ENCOUNTER (OUTPATIENT)
Dept: RADIOLOGY | Facility: MEDICAL CENTER | Age: 79
End: 2024-08-30
Attending: STUDENT IN AN ORGANIZED HEALTH CARE EDUCATION/TRAINING PROGRAM
Payer: MEDICARE

## 2024-08-30 DIAGNOSIS — C20 RECTAL ADENOCARCINOMA (HCC): ICD-10-CM

## 2024-09-04 DIAGNOSIS — C20 RECTAL ADENOCARCINOMA (HCC): ICD-10-CM

## 2024-09-04 DIAGNOSIS — Z01.812 BLOOD TESTS PRIOR TO TREATMENT OR PROCEDURE: ICD-10-CM

## 2024-09-04 DIAGNOSIS — Z13.1 ENCOUNTER FOR SCREENING FOR DIABETES MELLITUS: ICD-10-CM

## 2024-09-04 DIAGNOSIS — Z13.6 ENCOUNTER FOR SCREENING FOR CARDIOVASCULAR DISORDERS: ICD-10-CM

## 2024-09-06 ENCOUNTER — HOSPITAL ENCOUNTER (OUTPATIENT)
Dept: LAB | Facility: MEDICAL CENTER | Age: 79
End: 2024-09-06
Attending: STUDENT IN AN ORGANIZED HEALTH CARE EDUCATION/TRAINING PROGRAM
Payer: MEDICARE

## 2024-09-06 DIAGNOSIS — C20 RECTAL ADENOCARCINOMA (HCC): ICD-10-CM

## 2024-09-06 DIAGNOSIS — Z13.1 ENCOUNTER FOR SCREENING FOR DIABETES MELLITUS: ICD-10-CM

## 2024-09-06 DIAGNOSIS — Z13.6 ENCOUNTER FOR SCREENING FOR CARDIOVASCULAR DISORDERS: ICD-10-CM

## 2024-09-06 DIAGNOSIS — Z01.812 BLOOD TESTS PRIOR TO TREATMENT OR PROCEDURE: ICD-10-CM

## 2024-09-06 LAB
ALBUMIN SERPL BCP-MCNC: 4 G/DL (ref 3.2–4.9)
ALBUMIN/GLOB SERPL: 1.1 G/DL
ALP SERPL-CCNC: 83 U/L (ref 30–99)
ALT SERPL-CCNC: 15 U/L (ref 2–50)
ANION GAP SERPL CALC-SCNC: 12 MMOL/L (ref 7–16)
AST SERPL-CCNC: 15 U/L (ref 12–45)
BASOPHILS # BLD AUTO: 0.8 % (ref 0–1.8)
BASOPHILS # BLD: 0.06 K/UL (ref 0–0.12)
BILIRUB SERPL-MCNC: 0.4 MG/DL (ref 0.1–1.5)
BUN SERPL-MCNC: 41 MG/DL (ref 8–22)
CALCIUM ALBUM COR SERPL-MCNC: 9.9 MG/DL (ref 8.5–10.5)
CALCIUM SERPL-MCNC: 9.9 MG/DL (ref 8.5–10.5)
CEA SERPL-MCNC: 3.6 NG/ML (ref 0–3)
CHLORIDE SERPL-SCNC: 98 MMOL/L (ref 96–112)
CHOLEST SERPL-MCNC: 161 MG/DL (ref 100–199)
CO2 SERPL-SCNC: 21 MMOL/L (ref 20–33)
CREAT SERPL-MCNC: 1.18 MG/DL (ref 0.5–1.4)
EOSINOPHIL # BLD AUTO: 0.07 K/UL (ref 0–0.51)
EOSINOPHIL NFR BLD: 0.9 % (ref 0–6.9)
ERYTHROCYTE [DISTWIDTH] IN BLOOD BY AUTOMATED COUNT: 50.2 FL (ref 35.9–50)
EST. AVERAGE GLUCOSE BLD GHB EST-MCNC: 114 MG/DL
GFR SERPLBLD CREATININE-BSD FMLA CKD-EPI: 47 ML/MIN/1.73 M 2
GLOBULIN SER CALC-MCNC: 3.5 G/DL (ref 1.9–3.5)
GLUCOSE SERPL-MCNC: 98 MG/DL (ref 65–99)
HBA1C MFR BLD: 5.6 % (ref 4–5.6)
HCT VFR BLD AUTO: 37.4 % (ref 37–47)
HDLC SERPL-MCNC: 88 MG/DL
HGB BLD-MCNC: 12.5 G/DL (ref 12–16)
IMM GRANULOCYTES # BLD AUTO: 0.01 K/UL (ref 0–0.11)
IMM GRANULOCYTES NFR BLD AUTO: 0.1 % (ref 0–0.9)
LDLC SERPL CALC-MCNC: 64 MG/DL
LYMPHOCYTES # BLD AUTO: 1.5 K/UL (ref 1–4.8)
LYMPHOCYTES NFR BLD: 19.6 % (ref 22–41)
MCH RBC QN AUTO: 30.7 PG (ref 27–33)
MCHC RBC AUTO-ENTMCNC: 33.4 G/DL (ref 32.2–35.5)
MCV RBC AUTO: 91.9 FL (ref 81.4–97.8)
MONOCYTES # BLD AUTO: 0.56 K/UL (ref 0–0.85)
MONOCYTES NFR BLD AUTO: 7.3 % (ref 0–13.4)
NEUTROPHILS # BLD AUTO: 5.46 K/UL (ref 1.82–7.42)
NEUTROPHILS NFR BLD: 71.3 % (ref 44–72)
NRBC # BLD AUTO: 0 K/UL
NRBC BLD-RTO: 0 /100 WBC (ref 0–0.2)
PLATELET # BLD AUTO: 261 K/UL (ref 164–446)
PMV BLD AUTO: 11 FL (ref 9–12.9)
POTASSIUM SERPL-SCNC: 4.5 MMOL/L (ref 3.6–5.5)
PROT SERPL-MCNC: 7.5 G/DL (ref 6–8.2)
RBC # BLD AUTO: 4.07 M/UL (ref 4.2–5.4)
SODIUM SERPL-SCNC: 131 MMOL/L (ref 135–145)
TRIGL SERPL-MCNC: 43 MG/DL (ref 0–149)
WBC # BLD AUTO: 7.7 K/UL (ref 4.8–10.8)

## 2024-09-06 PROCEDURE — 36415 COLL VENOUS BLD VENIPUNCTURE: CPT | Mod: GA

## 2024-09-06 PROCEDURE — 83036 HEMOGLOBIN GLYCOSYLATED A1C: CPT | Mod: GA

## 2024-09-06 PROCEDURE — 82378 CARCINOEMBRYONIC ANTIGEN: CPT

## 2024-09-06 PROCEDURE — 80053 COMPREHEN METABOLIC PANEL: CPT

## 2024-09-06 PROCEDURE — 80061 LIPID PANEL: CPT

## 2024-09-06 PROCEDURE — 85025 COMPLETE CBC W/AUTO DIFF WBC: CPT

## 2024-09-09 ENCOUNTER — HOSPITAL ENCOUNTER (OUTPATIENT)
Dept: HEMATOLOGY ONCOLOGY | Facility: MEDICAL CENTER | Age: 79
End: 2024-09-09
Attending: STUDENT IN AN ORGANIZED HEALTH CARE EDUCATION/TRAINING PROGRAM
Payer: MEDICARE

## 2024-09-09 VITALS
BODY MASS INDEX: 36.22 KG/M2 | TEMPERATURE: 99.2 F | HEART RATE: 72 BPM | DIASTOLIC BLOOD PRESSURE: 60 MMHG | SYSTOLIC BLOOD PRESSURE: 108 MMHG | WEIGHT: 217.4 LBS | HEIGHT: 65 IN | OXYGEN SATURATION: 97 %

## 2024-09-09 DIAGNOSIS — C20 RECTAL ADENOCARCINOMA (HCC): ICD-10-CM

## 2024-09-09 PROCEDURE — 99212 OFFICE O/P EST SF 10 MIN: CPT | Performed by: STUDENT IN AN ORGANIZED HEALTH CARE EDUCATION/TRAINING PROGRAM

## 2024-09-09 PROCEDURE — 99213 OFFICE O/P EST LOW 20 MIN: CPT | Performed by: STUDENT IN AN ORGANIZED HEALTH CARE EDUCATION/TRAINING PROGRAM

## 2024-09-09 ASSESSMENT — PAIN SCALES - GENERAL: PAINLEVEL: NO PAIN

## 2024-09-09 ASSESSMENT — FIBROSIS 4 INDEX: FIB4 SCORE: 1.14

## 2024-09-10 ASSESSMENT — ENCOUNTER SYMPTOMS
SPUTUM PRODUCTION: 0
ABDOMINAL PAIN: 0
INSOMNIA: 0
HEADACHES: 0
FEVER: 0
SHORTNESS OF BREATH: 0
ORTHOPNEA: 0
NAUSEA: 0
BLURRED VISION: 0
MYALGIAS: 0
DIARRHEA: 0
VOMITING: 0
SINUS PAIN: 0
DIZZINESS: 0
DOUBLE VISION: 0
DIAPHORESIS: 0
COUGH: 0
SORE THROAT: 0
WHEEZING: 0
NERVOUS/ANXIOUS: 0
CONSTIPATION: 0
CHILLS: 0
WEAKNESS: 0
TINGLING: 0
PALPITATIONS: 0
WEIGHT LOSS: 0
BRUISES/BLEEDS EASILY: 0
HEARTBURN: 0
BLOOD IN STOOL: 0
BACK PAIN: 0

## 2024-09-10 NOTE — PROGRESS NOTES
Follow Up Note:  Hematology/Oncology      Primary Care:  Elian Torres M.D.    Diagnosis: Rectal/sigmoid adenocarcinoma    Chief Complaint: Surveillance visit    Current Treatment: Surveillance    Prior Treatment: Surgical resection    Oncology History of Presenting Illness:  Kaia Sorto is a 79 y.o.  woman who presents to the clinic for evaluation for systemic therapy for diagnosis of adenocarcinoma of the rectosigmoid area.  The patient was found on colonoscopy to have a lesion 15 to 18 cm from the anus in November 2023.  She had a rectal MRI which showed a potential T1 or T2 lesion, with no lymph nodes.  She was seen by colorectal surgery and was referred for evaluation for potential radiation therapy as well as chemotherapy.  She was unable to get scheduled with medical oncology at that time, and then ultimately ended up going for surgery alone in February 2024.  The final pathology reports showed a pT3 N0 M0 stage IIa disease.  She was subsequently referred to me for evaluation for therapy.     Treatment History:   02/2024: Surgical resection, [id number] stage II-a disease    Interval History:  Patient is here for follow up visit. She feels well and has no complaints.     Allergies as of 09/09/2024 - Reviewed 09/09/2024   Allergen Reaction Noted    Food  04/17/2024    Morphine Vomiting 12/03/2011         Current Outpatient Medications:     buPROPion SR (WELLBUTRIN-SR) 150 MG TABLET SR 12 HR sustained-release tablet, TAKE 2 TABLETS BY MOUTH IN THE MORNING AND THEN TAKE 1 TABLET IN THE AFTERNOON, Disp: , Rfl:     clonazePAM (KLONOPIN) 0.5 MG Tab, TAKE 1/2 TO 1 (ONE-HALF TO ONE) TABLET BY MOUTH ONCE DAILY AS NEEDED FOR STRESS OR ANXIETY, Disp: , Rfl:     Cholecalciferol 1.25 MG (45604 UT) Tab, Take  by mouth., Disp: , Rfl:     traZODone (DESYREL) 50 MG Tab, Take 50 mg by mouth at bedtime as needed for Sleep., Disp: , Rfl:     valsartan-hydrochlorothiazide (DIOVAN-HCT) 320-25 MG per tablet, Take 1  Tablet by mouth every day., Disp: , Rfl:     predniSONE (DELTASONE) 20 MG Tab, Take 2 tabs po daily for 5 days., Disp: 10 Tab, Rfl: 0    NON SPECIFIED, Currently taking a muscle relaxer, Disp: , Rfl:     MethylPREDNISolone (MEDROL DOSEPAK) 4 MG Tablet Therapy Pack, follow package directions, Disp: 21 Tab, Rfl: 0    celecoxib (CELEBREX) 100 MG Cap, Take 1 Cap by mouth 2 times a day., Disp: 60 Cap, Rfl: 0    baclofen (LIORESAL) 10 MG Tab, Take 1 Tab by mouth at bedtime as needed., Disp: 15 Tab, Rfl: 0    fluticasone (FLONASE) 50 MCG/ACT nasal spray, Spray 2 Sprays in nose every day., Disp: 1 Bottle, Rfl: 3    Diclofenac Sodium 1 % Gel, Apply 1 Application to skin as directed 3 times a day., Disp: 1 Tube, Rfl: 0    OLMESARTAN-AMLODIPINE-HCTZ PO, Take  by mouth., Disp: , Rfl:     melatonin 3 MG Tab, Take 3 mg by mouth every bedtime., Disp: , Rfl:     levothyroxine (SYNTHROID) 75 MCG Tab, Take 75 mcg by mouth Every morning on an empty stomach., Disp: , Rfl:     predniSONE (DELTASONE) 10 MG Tab, Take 10 mg by mouth every day. (Patient not taking: Reported on 4/17/2024), Disp: , Rfl:       Review of Systems:  Review of Systems   Constitutional:  Negative for chills, diaphoresis, fever, malaise/fatigue and weight loss.   HENT:  Negative for hearing loss, nosebleeds, sinus pain and sore throat.    Eyes:  Negative for blurred vision and double vision.   Respiratory:  Negative for cough, sputum production, shortness of breath and wheezing.    Cardiovascular:  Negative for chest pain, palpitations, orthopnea and leg swelling.   Gastrointestinal:  Negative for abdominal pain, blood in stool, constipation, diarrhea, heartburn, melena, nausea and vomiting.   Genitourinary:  Negative for dysuria, frequency, hematuria and urgency.   Musculoskeletal:  Negative for back pain, joint pain and myalgias.   Skin:  Negative for rash.   Neurological:  Negative for dizziness, tingling, weakness and headaches.   Endo/Heme/Allergies:  Does not  "bruise/bleed easily.   Psychiatric/Behavioral:  The patient is not nervous/anxious and does not have insomnia.          Physical Exam:  Vitals:    09/09/24 1303   BP: 108/60   BP Location: Left arm   Patient Position: Sitting   BP Cuff Size: Adult   Pulse: 72   Temp: 37.3 °C (99.2 °F)   TempSrc: Temporal   SpO2: 97%   Weight: 98.6 kg (217 lb 6.4 oz)   Height: 1.651 m (5' 5\")       DESC; KARNOFSKY SCALE WITH ECOG EQUIVALENT: 100, Fully active, able to carry on all pre-disease performed without restriction (ECOG equivalent 0)    DISTRESS LEVEL: no apparent distress    Physical Exam  Vitals and nursing note reviewed.   Constitutional:       General: She is awake. She is not in acute distress.     Appearance: Normal appearance. She is obese. She is not ill-appearing, toxic-appearing or diaphoretic.   HENT:      Head: Normocephalic and atraumatic.      Nose: Nose normal. No congestion.      Mouth/Throat:      Pharynx: Oropharynx is clear. No oropharyngeal exudate or posterior oropharyngeal erythema.   Eyes:      General: No scleral icterus.     Extraocular Movements: Extraocular movements intact.      Conjunctiva/sclera: Conjunctivae normal.      Pupils: Pupils are equal, round, and reactive to light.   Cardiovascular:      Rate and Rhythm: Normal rate and regular rhythm.      Pulses: Normal pulses.      Heart sounds: Normal heart sounds. No murmur heard.     No friction rub. No gallop.   Pulmonary:      Effort: Pulmonary effort is normal.      Breath sounds: Normal breath sounds. No decreased air movement. No wheezing, rhonchi or rales.   Abdominal:      General: Bowel sounds are normal. There is no distension.      Tenderness: There is no abdominal tenderness.   Musculoskeletal:         General: No deformity. Normal range of motion.      Cervical back: Normal range of motion and neck supple. No tenderness.      Right lower leg: No edema.      Left lower leg: No edema.   Lymphadenopathy:      Cervical: No cervical " adenopathy.      Upper Body:      Right upper body: No axillary adenopathy.      Left upper body: No axillary adenopathy.      Lower Body: No right inguinal adenopathy. No left inguinal adenopathy.   Skin:     General: Skin is warm and dry.      Coloration: Skin is not jaundiced.      Findings: No erythema or rash.   Neurological:      General: No focal deficit present.      Mental Status: She is alert and oriented to person, place, and time.      Sensory: Sensation is intact.      Motor: Motor function is intact. No weakness.      Gait: Gait is intact.   Psychiatric:         Attention and Perception: Attention normal.         Mood and Affect: Mood normal.         Behavior: Behavior normal. Behavior is cooperative.         Thought Content: Thought content normal.         Judgment: Judgment normal.           Labs:  Hospital Outpatient Visit on 09/06/2024   Component Date Value Ref Range Status    Glycohemoglobin 09/06/2024 5.6  4.0 - 5.6 % Final    Comment: Increased risk for diabetes:  5.7 -6.4%  Diabetes:  >6.4%  Glycemic control for adults with diabetes:  <7.0%    The above interpretations are per ADA guidelines.  Diagnosis  of diabetes mellitus on the basis of elevated Hemoglobin A1c  should be confirmed by repeating the Hb A1c test.      Est Avg Glucose 09/06/2024 114  mg/dL Final    Comment: The eAG calculation is based on the A1c-Derived Daily Glucose  (ADAG) study.  See the ADA's website for additional information.      Cholesterol,Tot 09/06/2024 161  100 - 199 mg/dL Final    Triglycerides 09/06/2024 43  0 - 149 mg/dL Final    HDL 09/06/2024 88  >=40 mg/dL Final    LDL 09/06/2024 64  <100 mg/dL Final    Carcinoembryonic Antigen 09/06/2024 3.6 (H)  0.0 - 3.0 ng/mL Final    Comment: Performed using Roche shanthi e immunoassay analyzer. CEA values determined on  patient samples by different testing procedures cannot be directly compared  with one another and could be the cause of erroneous  medical  interpretations. If there is a change in the CEA assay procedure used while  monitoring therapy, then new baselines may need to be established when  comparing previous results.      Sodium 09/06/2024 131 (L)  135 - 145 mmol/L Final    Potassium 09/06/2024 4.5  3.6 - 5.5 mmol/L Final    Chloride 09/06/2024 98  96 - 112 mmol/L Final    Co2 09/06/2024 21  20 - 33 mmol/L Final    Anion Gap 09/06/2024 12.0  7.0 - 16.0 Final    Glucose 09/06/2024 98  65 - 99 mg/dL Final    Bun 09/06/2024 41 (H)  8 - 22 mg/dL Final    Creatinine 09/06/2024 1.18  0.50 - 1.40 mg/dL Final    Calcium 09/06/2024 9.9  8.5 - 10.5 mg/dL Final    Correct Calcium 09/06/2024 9.9  8.5 - 10.5 mg/dL Final    AST(SGOT) 09/06/2024 15  12 - 45 U/L Final    ALT(SGPT) 09/06/2024 15  2 - 50 U/L Final    Alkaline Phosphatase 09/06/2024 83  30 - 99 U/L Final    Total Bilirubin 09/06/2024 0.4  0.1 - 1.5 mg/dL Final    Albumin 09/06/2024 4.0  3.2 - 4.9 g/dL Final    Total Protein 09/06/2024 7.5  6.0 - 8.2 g/dL Final    Globulin 09/06/2024 3.5  1.9 - 3.5 g/dL Final    A-G Ratio 09/06/2024 1.1  g/dL Final    WBC 09/06/2024 7.7  4.8 - 10.8 K/uL Final    RBC 09/06/2024 4.07 (L)  4.20 - 5.40 M/uL Final    Hemoglobin 09/06/2024 12.5  12.0 - 16.0 g/dL Final    Hematocrit 09/06/2024 37.4  37.0 - 47.0 % Final    MCV 09/06/2024 91.9  81.4 - 97.8 fL Final    MCH 09/06/2024 30.7  27.0 - 33.0 pg Final    MCHC 09/06/2024 33.4  32.2 - 35.5 g/dL Final    RDW 09/06/2024 50.2 (H)  35.9 - 50.0 fL Final    Platelet Count 09/06/2024 261  164 - 446 K/uL Final    MPV 09/06/2024 11.0  9.0 - 12.9 fL Final    Neutrophils-Polys 09/06/2024 71.30  44.00 - 72.00 % Final    Lymphocytes 09/06/2024 19.60 (L)  22.00 - 41.00 % Final    Monocytes 09/06/2024 7.30  0.00 - 13.40 % Final    Eosinophils 09/06/2024 0.90  0.00 - 6.90 % Final    Basophils 09/06/2024 0.80  0.00 - 1.80 % Final    Immature Granulocytes 09/06/2024 0.10  0.00 - 0.90 % Final    Nucleated RBC 09/06/2024 0.00  0.00 - 0.20  /100 WBC Final    Neutrophils (Absolute) 09/06/2024 5.46  1.82 - 7.42 K/uL Final    Includes immature neutrophils, if present.    Lymphs (Absolute) 09/06/2024 1.50  1.00 - 4.80 K/uL Final    Monos (Absolute) 09/06/2024 0.56  0.00 - 0.85 K/uL Final    Eos (Absolute) 09/06/2024 0.07  0.00 - 0.51 K/uL Final    Baso (Absolute) 09/06/2024 0.06  0.00 - 0.12 K/uL Final    Immature Granulocytes (abs) 09/06/2024 0.01  0.00 - 0.11 K/uL Final    NRBC (Absolute) 09/06/2024 0.00  K/uL Final    GFR (CKD-EPI) 09/06/2024 47 (A)  >60 mL/min/1.73 m 2 Final    Comment: Estimated Glomerular Filtration Rate is calculated using  race neutral CKD-EPI 2021 equation per NKF-ASN recommendations.         Imaging:     All listed images below have been independently reviewed by me. I agree with the findings as summarized below:    No results found.    Pathology:  Reported [id number] stage II-a disease (adenocarcinoma, MSI-S)     Signatera 04/18/24: Not detected    Assessment & Plan:  1. Rectal adenocarcinoma (HCC)          This is a 79 year old  woman with adenocarcinoma of the rectosigmoid colon, s/p resection in 02/2024. She presents for evaluation.      Current Diagnosis and Staging: Adenocarcinoma of the rectosigmoid colon, [id number] stage II-a disease, MSI-S    Update: Signatera testing revealed no disease. She will proceed with surveillance. Return in 3 months for labs and exam. Patient missed scan appointment due to labs but has gotten it rescheduled, so we will let her know if the scans show anything abnormal.      Treatment Plan: Surveillance     Treatment Citation: NCCN     Plan of Care:     Primary Therapy: NA  Supportive Therapy: Medical management per primary  Toxicity: NA   Labs: CBC with diff, CMP, CEA monitoring. ctDNA monitoring.   Imaging: CT c/a/p due now  Treatment Planning: Patient has reported stage II-a disease, which was potentially more sigmoid than rectal adenocarcinoma. ctDNA testing was negative, so we will go on  surveillance.   Consultations: Colorectal surgery (Dr. Matamoros)  Code Status: Full  Miscellaneous: NA  Return for Follow Up: 3 months    Any questions and concerns raised by the patient were answered to the best of my ability. Thank you for allowing me to participate in the care for this patient. Please feel free to contact me for any questions or concerns.       Total time spent on chart review, clinic encounter, and documentation: 22 minutes.

## 2024-09-14 LAB
NTRA SIGNATERA MTM READOUT: 0 MTM/ML
NTRA SIGNATERA TEST RESULT: NEGATIVE

## 2024-09-20 ENCOUNTER — HOSPITAL ENCOUNTER (OUTPATIENT)
Dept: RADIOLOGY | Facility: MEDICAL CENTER | Age: 79
End: 2024-09-20
Attending: STUDENT IN AN ORGANIZED HEALTH CARE EDUCATION/TRAINING PROGRAM
Payer: MEDICARE

## 2024-09-20 PROCEDURE — 700117 HCHG RX CONTRAST REV CODE 255: Performed by: STUDENT IN AN ORGANIZED HEALTH CARE EDUCATION/TRAINING PROGRAM

## 2024-09-20 PROCEDURE — 71260 CT THORAX DX C+: CPT

## 2024-09-20 RX ADMIN — IOHEXOL 100 ML: 350 INJECTION, SOLUTION INTRAVENOUS at 15:15

## 2024-09-27 DIAGNOSIS — C20 RECTAL ADENOCARCINOMA (HCC): ICD-10-CM

## 2024-12-03 ENCOUNTER — TELEPHONE (OUTPATIENT)
Dept: HEMATOLOGY ONCOLOGY | Facility: MEDICAL CENTER | Age: 79
End: 2024-12-03
Payer: MEDICARE

## 2024-12-03 NOTE — TELEPHONE ENCOUNTER
Patient LVM- she has an appointment on 12/13 to discuss her signatera results, but is getting signatera drawn on 12/6- patient wants to know if she should cancel this appointment or reschedule?

## 2024-12-13 ENCOUNTER — APPOINTMENT (OUTPATIENT)
Dept: HEMATOLOGY ONCOLOGY | Facility: MEDICAL CENTER | Age: 79
End: 2024-12-13
Payer: MEDICARE

## 2024-12-18 LAB
NTRA SIGNATERA MTM READOUT: 0 MTM/ML
NTRA SIGNATERA TEST RESULT: NEGATIVE

## 2025-01-09 ENCOUNTER — HOSPITAL ENCOUNTER (OUTPATIENT)
Dept: LAB | Facility: MEDICAL CENTER | Age: 80
End: 2025-01-09
Attending: FAMILY MEDICINE
Payer: MEDICARE

## 2025-01-09 LAB — TSH SERPL-ACNC: 0.99 UIU/ML (ref 0.35–5.5)

## 2025-01-09 PROCEDURE — 84443 ASSAY THYROID STIM HORMONE: CPT

## 2025-01-09 PROCEDURE — 36415 COLL VENOUS BLD VENIPUNCTURE: CPT

## 2025-01-13 ENCOUNTER — HOSPITAL ENCOUNTER (OUTPATIENT)
Dept: HEMATOLOGY ONCOLOGY | Facility: MEDICAL CENTER | Age: 80
End: 2025-01-13
Attending: STUDENT IN AN ORGANIZED HEALTH CARE EDUCATION/TRAINING PROGRAM
Payer: MEDICARE

## 2025-01-13 DIAGNOSIS — C20 RECTAL ADENOCARCINOMA (HCC): ICD-10-CM

## 2025-01-13 PROCEDURE — 99213 OFFICE O/P EST LOW 20 MIN: CPT | Mod: 95 | Performed by: STUDENT IN AN ORGANIZED HEALTH CARE EDUCATION/TRAINING PROGRAM

## 2025-01-13 RX ORDER — APIXABAN 5 MG/1
1 TABLET, FILM COATED ORAL 2 TIMES DAILY
COMMUNITY

## 2025-01-13 ASSESSMENT — ENCOUNTER SYMPTOMS
WEIGHT LOSS: 0
DIZZINESS: 0
VOMITING: 0
WEAKNESS: 0
ABDOMINAL PAIN: 0
SHORTNESS OF BREATH: 0
PALPITATIONS: 0
DIARRHEA: 0
HEADACHES: 0
BLOOD IN STOOL: 0
DIAPHORESIS: 0
BLURRED VISION: 0
CHILLS: 0
CONSTIPATION: 0
DOUBLE VISION: 0
SINUS PAIN: 0
INSOMNIA: 0
HEARTBURN: 0
NAUSEA: 0
BACK PAIN: 0
BRUISES/BLEEDS EASILY: 0
SPUTUM PRODUCTION: 0
ORTHOPNEA: 0
FEVER: 0
COUGH: 0
TINGLING: 0
SORE THROAT: 0
NERVOUS/ANXIOUS: 0
MYALGIAS: 0
WHEEZING: 0

## 2025-01-13 NOTE — PROGRESS NOTES
Follow Up Note:  Hematology/Oncology    This evaluation was conducted via Teams using secure and encrypted videoconferencing technology. The patient was in their home in the [facility].    The patient's identity was confirmed and verbal consent was obtained for this virtual visit.      Primary Care:  Elian Torres M.D.    Diagnosis: Rectal/sigmoid adenocarcinoma    Chief Complaint: Surveillance visit    Current Treatment: Surveillance    Prior Treatment: Surgical resection    Oncology History of Presenting Illness:  Kaia Sorto is a 79 y.o.  woman who presents to the clinic for evaluation for systemic therapy for diagnosis of adenocarcinoma of the rectosigmoid area.  The patient was found on colonoscopy to have a lesion 15 to 18 cm from the anus in November 2023.  She had a rectal MRI which showed a potential T1 or T2 lesion, with no lymph nodes.  She was seen by colorectal surgery and was referred for evaluation for potential radiation therapy as well as chemotherapy.  She was unable to get scheduled with medical oncology at that time, and then ultimately ended up going for surgery alone in February 2024.  The final pathology reports showed a pT3 N0 M0 stage IIa disease.  She was subsequently referred to me for evaluation for therapy.     Treatment History:   02/2024: Surgical resection, [id number] stage II-a disease    Interval History:  Patient presents virtually for follow up. She feels well and has no complaints. She had a tough November though, because she had an emergency appendectomy and then was admitted for atrial fibrillation and ultimately had an ablation. She otherwise is doing well, though.     Allergies as of 01/13/2025 - Reviewed 01/13/2025   Allergen Reaction Noted    Food  04/17/2024    Morphine Vomiting 12/03/2011         Current Outpatient Medications:     ELIQUIS 5 MG Tab, Take 1 Tablet by mouth 2 times a day., Disp: , Rfl:     buPROPion SR (WELLBUTRIN-SR) 150 MG TABLET SR 12 HR  sustained-release tablet, TAKE 2 TABLETS BY MOUTH IN THE MORNING AND THEN TAKE 1 TABLET IN THE AFTERNOON, Disp: , Rfl:     clonazePAM (KLONOPIN) 0.5 MG Tab, TAKE 1/2 TO 1 (ONE-HALF TO ONE) TABLET BY MOUTH ONCE DAILY AS NEEDED FOR STRESS OR ANXIETY, Disp: , Rfl:     Cholecalciferol 1.25 MG (97176 UT) Tab, Take  by mouth., Disp: , Rfl:     traZODone (DESYREL) 50 MG Tab, Take 50 mg by mouth at bedtime as needed for Sleep., Disp: , Rfl:     NON SPECIFIED, Currently taking a muscle relaxer, Disp: , Rfl:     celecoxib (CELEBREX) 100 MG Cap, Take 1 Cap by mouth 2 times a day., Disp: 60 Cap, Rfl: 0    baclofen (LIORESAL) 10 MG Tab, Take 1 Tab by mouth at bedtime as needed., Disp: 15 Tab, Rfl: 0    fluticasone (FLONASE) 50 MCG/ACT nasal spray, Spray 2 Sprays in nose every day., Disp: 1 Bottle, Rfl: 3    Diclofenac Sodium 1 % Gel, Apply 1 Application to skin as directed 3 times a day., Disp: 1 Tube, Rfl: 0    OLMESARTAN-AMLODIPINE-HCTZ PO, Take  by mouth., Disp: , Rfl:     melatonin 3 MG Tab, Take 3 mg by mouth every bedtime., Disp: , Rfl:     levothyroxine (SYNTHROID) 75 MCG Tab, Take 75 mcg by mouth Every morning on an empty stomach., Disp: , Rfl:     valsartan-hydrochlorothiazide (DIOVAN-HCT) 320-25 MG per tablet, Take 1 Tablet by mouth every day. (Patient not taking: Reported on 1/13/2025), Disp: , Rfl:     predniSONE (DELTASONE) 10 MG Tab, Take 10 mg by mouth every day. (Patient not taking: Reported on 4/17/2024), Disp: , Rfl:     predniSONE (DELTASONE) 20 MG Tab, Take 2 tabs po daily for 5 days. (Patient not taking: Reported on 1/13/2025), Disp: 10 Tab, Rfl: 0    MethylPREDNISolone (MEDROL DOSEPAK) 4 MG Tablet Therapy Pack, follow package directions (Patient not taking: Reported on 1/13/2025), Disp: 21 Tab, Rfl: 0      Review of Systems:  Review of Systems   Constitutional:  Negative for chills, diaphoresis, fever, malaise/fatigue and weight loss.   HENT:  Negative for hearing loss, nosebleeds, sinus pain and sore  throat.    Eyes:  Negative for blurred vision and double vision.   Respiratory:  Negative for cough, sputum production, shortness of breath and wheezing.    Cardiovascular:  Negative for chest pain, palpitations, orthopnea and leg swelling.   Gastrointestinal:  Negative for abdominal pain, blood in stool, constipation, diarrhea, heartburn, melena, nausea and vomiting.   Genitourinary:  Negative for dysuria, frequency, hematuria and urgency.   Musculoskeletal:  Negative for back pain, joint pain and myalgias.   Skin:  Negative for rash.   Neurological:  Negative for dizziness, tingling, weakness and headaches.   Endo/Heme/Allergies:  Does not bruise/bleed easily.   Psychiatric/Behavioral:  The patient is not nervous/anxious and does not have insomnia.          Physical Exam:  There were no vitals filed for this visit.      DESC; KARNOFSKY SCALE WITH ECOG EQUIVALENT: 100, Fully active, able to carry on all pre-disease performed without restriction (ECOG equivalent 0)    DISTRESS LEVEL: no apparent distress    Physical Exam  Vitals and nursing note reviewed.   Constitutional:       General: She is awake. She is not in acute distress.     Appearance: Normal appearance. She is obese. She is not ill-appearing, toxic-appearing or diaphoretic.   HENT:      Head: Normocephalic and atraumatic.   Eyes:      Extraocular Movements: Extraocular movements intact.      Conjunctiva/sclera: Conjunctivae normal.   Pulmonary:      Effort: Pulmonary effort is normal.      Breath sounds: No decreased air movement.   Musculoskeletal:         General: Normal range of motion.      Cervical back: Normal range of motion.   Lymphadenopathy:      Upper Body:      Right upper body: No axillary adenopathy.      Left upper body: No axillary adenopathy.      Lower Body: No right inguinal adenopathy. No left inguinal adenopathy.   Skin:     Coloration: Skin is not jaundiced.      Findings: No erythema or rash.   Neurological:      General: No focal  deficit present.      Mental Status: She is alert and oriented to person, place, and time.      Sensory: Sensation is intact.      Motor: Motor function is intact. No weakness.      Gait: Gait is intact.   Psychiatric:         Attention and Perception: Attention normal.         Mood and Affect: Mood normal. Affect is not tearful.         Behavior: Behavior normal. Behavior is cooperative.         Thought Content: Thought content normal.         Judgment: Judgment normal.           Labs:  Hospital Outpatient Visit on 01/09/2025   Component Date Value Ref Range Status    TSH 01/09/2025 0.990  0.350 - 5.500 uIU/mL Final       Imaging:     All listed images below have been independently reviewed by me. I agree with the findings as summarized below:    No results found.    Pathology:  Reported [id number] stage II-a disease (adenocarcinoma, MSI-S)     Signatera 04/18/24: Not detected    Assessment & Plan:  1. Rectal adenocarcinoma (HCC)          This is a 79 year old  woman with adenocarcinoma of the rectosigmoid colon, s/p resection in 02/2024. She presents for evaluation.      Current Diagnosis and Staging: Adenocarcinoma of the rectosigmoid colon, [id number] stage II-a disease, MSI-S    Update: Signatera testing revealed no disease. She will proceed with surveillance. Return in 3 months for labs, scans, and exam.      Treatment Plan: Surveillance     Treatment Citation: NCCN     Plan of Care:     Primary Therapy: NA  Supportive Therapy: Medical management per primary  Toxicity: NA   Labs: CBC with diff, CMP, CEA monitoring. ctDNA monitoring.   Imaging: CT c/a/p due 04/2025  Treatment Planning: Patient has reported stage II-a disease, which was potentially more sigmoid than rectal adenocarcinoma. ctDNA testing was negative, so we will go on surveillance.   Consultations: Colorectal surgery (Dr. Matamoros)  Code Status: Full  Miscellaneous: NA  Return for Follow Up: 3 months    Any questions and concerns raised by the  patient were answered to the best of my ability. Thank you for allowing me to participate in the care for this patient. Please feel free to contact me for any questions or concerns.       Total time spent on chart review, clinic encounter, and documentation: 21 minutes.

## 2025-02-10 ENCOUNTER — APPOINTMENT (OUTPATIENT)
Dept: INTERNAL MEDICINE | Facility: OTHER | Age: 80
End: 2025-02-10
Payer: MEDICARE

## 2025-03-05 ENCOUNTER — TELEPHONE (OUTPATIENT)
Dept: HEMATOLOGY ONCOLOGY | Facility: MEDICAL CENTER | Age: 80
End: 2025-03-05
Payer: MEDICARE

## 2025-03-07 ENCOUNTER — HOSPITAL ENCOUNTER (OUTPATIENT)
Facility: MEDICAL CENTER | Age: 80
End: 2025-03-07
Attending: STUDENT IN AN ORGANIZED HEALTH CARE EDUCATION/TRAINING PROGRAM
Payer: MEDICARE

## 2025-03-07 DIAGNOSIS — C20 RECTAL ADENOCARCINOMA (HCC): ICD-10-CM

## 2025-03-07 LAB
BASOPHILS # BLD AUTO: 0.8 % (ref 0–1.8)
BASOPHILS # BLD: 0.06 K/UL (ref 0–0.12)
EOSINOPHIL # BLD AUTO: 0.06 K/UL (ref 0–0.51)
EOSINOPHIL NFR BLD: 0.8 % (ref 0–6.9)
ERYTHROCYTE [DISTWIDTH] IN BLOOD BY AUTOMATED COUNT: 54.4 FL (ref 35.9–50)
HCT VFR BLD AUTO: 36.7 % (ref 37–47)
HGB BLD-MCNC: 12.2 G/DL (ref 12–16)
IMM GRANULOCYTES # BLD AUTO: 0.03 K/UL (ref 0–0.11)
IMM GRANULOCYTES NFR BLD AUTO: 0.4 % (ref 0–0.9)
LYMPHOCYTES # BLD AUTO: 1.64 K/UL (ref 1–4.8)
LYMPHOCYTES NFR BLD: 22 % (ref 22–41)
MCH RBC QN AUTO: 30.9 PG (ref 27–33)
MCHC RBC AUTO-ENTMCNC: 33.2 G/DL (ref 32.2–35.5)
MCV RBC AUTO: 92.9 FL (ref 81.4–97.8)
MONOCYTES # BLD AUTO: 0.45 K/UL (ref 0–0.85)
MONOCYTES NFR BLD AUTO: 6 % (ref 0–13.4)
NEUTROPHILS # BLD AUTO: 5.23 K/UL (ref 1.82–7.42)
NEUTROPHILS NFR BLD: 70 % (ref 44–72)
NRBC # BLD AUTO: 0 K/UL
NRBC BLD-RTO: 0 /100 WBC (ref 0–0.2)
PLATELET # BLD AUTO: 259 K/UL (ref 164–446)
PMV BLD AUTO: 11 FL (ref 9–12.9)
RBC # BLD AUTO: 3.95 M/UL (ref 4.2–5.4)
WBC # BLD AUTO: 7.5 K/UL (ref 4.8–10.8)

## 2025-03-07 PROCEDURE — 85025 COMPLETE CBC W/AUTO DIFF WBC: CPT

## 2025-03-07 PROCEDURE — 82378 CARCINOEMBRYONIC ANTIGEN: CPT

## 2025-03-07 PROCEDURE — 80053 COMPREHEN METABOLIC PANEL: CPT

## 2025-03-08 LAB
ALBUMIN SERPL BCP-MCNC: 4.1 G/DL (ref 3.2–4.9)
ALBUMIN/GLOB SERPL: 1.3 G/DL
ALP SERPL-CCNC: 80 U/L (ref 30–99)
ALT SERPL-CCNC: 13 U/L (ref 2–50)
ANION GAP SERPL CALC-SCNC: 13 MMOL/L (ref 7–16)
AST SERPL-CCNC: 16 U/L (ref 12–45)
BILIRUB SERPL-MCNC: 0.5 MG/DL (ref 0.1–1.5)
BUN SERPL-MCNC: 49 MG/DL (ref 8–22)
CALCIUM ALBUM COR SERPL-MCNC: 9.8 MG/DL (ref 8.5–10.5)
CALCIUM SERPL-MCNC: 9.9 MG/DL (ref 8.5–10.5)
CEA SERPL-MCNC: 3.8 NG/ML (ref 0–3)
CHLORIDE SERPL-SCNC: 102 MMOL/L (ref 96–112)
CO2 SERPL-SCNC: 24 MMOL/L (ref 20–33)
CREAT SERPL-MCNC: 1.49 MG/DL (ref 0.5–1.4)
GFR SERPLBLD CREATININE-BSD FMLA CKD-EPI: 36 ML/MIN/1.73 M 2
GLOBULIN SER CALC-MCNC: 3.1 G/DL (ref 1.9–3.5)
GLUCOSE SERPL-MCNC: 93 MG/DL (ref 65–99)
POTASSIUM SERPL-SCNC: 4.8 MMOL/L (ref 3.6–5.5)
PROT SERPL-MCNC: 7.2 G/DL (ref 6–8.2)
SODIUM SERPL-SCNC: 139 MMOL/L (ref 135–145)

## 2025-03-14 ENCOUNTER — TELEPHONE (OUTPATIENT)
Dept: HEMATOLOGY ONCOLOGY | Facility: MEDICAL CENTER | Age: 80
End: 2025-03-14
Payer: MEDICARE

## 2025-03-14 LAB
NTRA SIGNATERA MTM READOUT: 0 MTM/ML
NTRA SIGNATERA TEST RESULT: NEGATIVE

## 2025-03-14 NOTE — TELEPHONE ENCOUNTER
Patient LVM with concerns of her most recent lab results with the BUN and creatine being elevated with an upcoming CT scan. Patient also concerned about signatera test stating she has cancer, however she got two reports with one saying it is negative. Patient would like call to discuss labs ASAP as she is worried.

## 2025-03-21 ENCOUNTER — APPOINTMENT (OUTPATIENT)
Dept: RADIOLOGY | Facility: MEDICAL CENTER | Age: 80
End: 2025-03-21
Attending: STUDENT IN AN ORGANIZED HEALTH CARE EDUCATION/TRAINING PROGRAM
Payer: MEDICARE

## 2025-03-28 DIAGNOSIS — C20 RECTAL ADENOCARCINOMA (HCC): ICD-10-CM

## 2025-03-28 DIAGNOSIS — Z01.812 BLOOD TESTS PRIOR TO TREATMENT OR PROCEDURE: ICD-10-CM

## 2025-04-04 ENCOUNTER — HOSPITAL ENCOUNTER (OUTPATIENT)
Dept: LAB | Facility: MEDICAL CENTER | Age: 80
End: 2025-04-04
Attending: STUDENT IN AN ORGANIZED HEALTH CARE EDUCATION/TRAINING PROGRAM
Payer: MEDICARE

## 2025-04-04 DIAGNOSIS — Z01.812 BLOOD TESTS PRIOR TO TREATMENT OR PROCEDURE: ICD-10-CM

## 2025-04-04 DIAGNOSIS — C20 RECTAL ADENOCARCINOMA (HCC): ICD-10-CM

## 2025-04-04 LAB
ANION GAP SERPL CALC-SCNC: 10 MMOL/L (ref 7–16)
BUN SERPL-MCNC: 23 MG/DL (ref 8–22)
CALCIUM SERPL-MCNC: 9.3 MG/DL (ref 8.5–10.5)
CHLORIDE SERPL-SCNC: 87 MMOL/L (ref 96–112)
CO2 SERPL-SCNC: 25 MMOL/L (ref 20–33)
CREAT SERPL-MCNC: 1.27 MG/DL (ref 0.5–1.4)
GFR SERPLBLD CREATININE-BSD FMLA CKD-EPI: 43 ML/MIN/1.73 M 2
GLUCOSE SERPL-MCNC: 108 MG/DL (ref 65–99)
POTASSIUM SERPL-SCNC: 4.6 MMOL/L (ref 3.6–5.5)
SODIUM SERPL-SCNC: 122 MMOL/L (ref 135–145)

## 2025-04-04 PROCEDURE — 36415 COLL VENOUS BLD VENIPUNCTURE: CPT

## 2025-04-04 PROCEDURE — 80048 BASIC METABOLIC PNL TOTAL CA: CPT

## 2025-04-07 ENCOUNTER — HOSPITAL ENCOUNTER (OUTPATIENT)
Dept: HEMATOLOGY ONCOLOGY | Facility: MEDICAL CENTER | Age: 80
End: 2025-04-07
Attending: STUDENT IN AN ORGANIZED HEALTH CARE EDUCATION/TRAINING PROGRAM
Payer: MEDICARE

## 2025-04-07 DIAGNOSIS — C20 RECTAL ADENOCARCINOMA (HCC): ICD-10-CM

## 2025-04-07 DIAGNOSIS — E87.1 HYPONATREMIA: ICD-10-CM

## 2025-04-07 PROCEDURE — 99214 OFFICE O/P EST MOD 30 MIN: CPT | Mod: 95 | Performed by: STUDENT IN AN ORGANIZED HEALTH CARE EDUCATION/TRAINING PROGRAM

## 2025-04-07 ASSESSMENT — ENCOUNTER SYMPTOMS
HEADACHES: 0
DOUBLE VISION: 0
WEIGHT LOSS: 0
BLURRED VISION: 0
WHEEZING: 0
INSOMNIA: 0
CONSTIPATION: 0
DIAPHORESIS: 0
COUGH: 0
DIZZINESS: 0
HEARTBURN: 0
NERVOUS/ANXIOUS: 0
ORTHOPNEA: 0
TINGLING: 0
NAUSEA: 0
ABDOMINAL PAIN: 0
BACK PAIN: 0
VOMITING: 0
DIARRHEA: 0
SHORTNESS OF BREATH: 0
WEAKNESS: 0
SORE THROAT: 0
FEVER: 0
BLOOD IN STOOL: 0
CHILLS: 0
MYALGIAS: 0
PALPITATIONS: 0
SPUTUM PRODUCTION: 0
SINUS PAIN: 0
BRUISES/BLEEDS EASILY: 0

## 2025-04-07 NOTE — PROGRESS NOTES
Follow Up Note:  Hematology/Oncology    This evaluation was conducted via Teams using secure and encrypted videoconferencing technology. The patient was in their home in the [facility].    The patient's identity was confirmed and verbal consent was obtained for this virtual visit.      Primary Care:  Elian Torres M.D.    Diagnosis: Rectal/sigmoid adenocarcinoma    Chief Complaint: Surveillance visit    Current Treatment: Surveillance    Prior Treatment: Surgical resection    Oncology History of Presenting Illness:  Kaia Sorto is a 79 y.o.  woman who presents to the clinic for evaluation for systemic therapy for diagnosis of adenocarcinoma of the rectosigmoid area.  The patient was found on colonoscopy to have a lesion 15 to 18 cm from the anus in November 2023.  She had a rectal MRI which showed a potential T1 or T2 lesion, with no lymph nodes.  She was seen by colorectal surgery and was referred for evaluation for potential radiation therapy as well as chemotherapy.  She was unable to get scheduled with medical oncology at that time, and then ultimately ended up going for surgery alone in February 2024.  The final pathology reports showed a pT3 N0 M0 stage IIa disease.  She was subsequently referred to me for evaluation for therapy.     Treatment History:   02/2024: Surgical resection, [id number] stage II-a disease    Interval History:  Patient presents virtually for follow up. She is doing well for the most part. She was supposed to get scans done but there was concern about her kidney function so she was instructed to hydrate heavily. She has been drinking a lot of water for the past two weeks and her chemistry panel showed that her sodium level was 122. She has some mild swelling in her legs but otherwise is about the same.     Allergies as of 04/07/2025 - Reviewed 04/07/2025   Allergen Reaction Noted    Food  04/17/2024         Current Outpatient Medications:     ELIQUIS 5 MG Tab, Take 1  Tablet by mouth 2 times a day., Disp: , Rfl:     buPROPion SR (WELLBUTRIN-SR) 150 MG TABLET SR 12 HR sustained-release tablet, TAKE 2 TABLETS BY MOUTH IN THE MORNING AND THEN TAKE 1 TABLET IN THE AFTERNOON, Disp: , Rfl:     clonazePAM (KLONOPIN) 0.5 MG Tab, TAKE 1/2 TO 1 (ONE-HALF TO ONE) TABLET BY MOUTH ONCE DAILY AS NEEDED FOR STRESS OR ANXIETY, Disp: , Rfl:     traZODone (DESYREL) 50 MG Tab, Take 50 mg by mouth at bedtime as needed for Sleep., Disp: , Rfl:     NON SPECIFIED, Currently taking a muscle relaxer, Disp: , Rfl:     melatonin 3 MG Tab, Take 3 mg by mouth every bedtime., Disp: , Rfl:     levothyroxine (SYNTHROID) 75 MCG Tab, Take 75 mcg by mouth Every morning on an empty stomach., Disp: , Rfl:     Cholecalciferol 1.25 MG (29297 UT) Tab, Take  by mouth., Disp: , Rfl:     valsartan-hydrochlorothiazide (DIOVAN-HCT) 320-25 MG per tablet, Take 1 Tablet by mouth every day. (Patient not taking: Reported on 4/7/2025), Disp: , Rfl:     predniSONE (DELTASONE) 10 MG Tab, Take 10 mg by mouth every day. (Patient not taking: Reported on 4/17/2024), Disp: , Rfl:     predniSONE (DELTASONE) 20 MG Tab, Take 2 tabs po daily for 5 days. (Patient not taking: Reported on 1/13/2025), Disp: 10 Tab, Rfl: 0    MethylPREDNISolone (MEDROL DOSEPAK) 4 MG Tablet Therapy Pack, follow package directions (Patient not taking: Reported on 1/13/2025), Disp: 21 Tab, Rfl: 0    celecoxib (CELEBREX) 100 MG Cap, Take 1 Cap by mouth 2 times a day., Disp: 60 Cap, Rfl: 0    baclofen (LIORESAL) 10 MG Tab, Take 1 Tab by mouth at bedtime as needed., Disp: 15 Tab, Rfl: 0    fluticasone (FLONASE) 50 MCG/ACT nasal spray, Spray 2 Sprays in nose every day., Disp: 1 Bottle, Rfl: 3    Diclofenac Sodium 1 % Gel, Apply 1 Application to skin as directed 3 times a day., Disp: 1 Tube, Rfl: 0    OLMESARTAN-AMLODIPINE-HCTZ PO, Take  by mouth., Disp: , Rfl:       Review of Systems:  Review of Systems   Constitutional:  Negative for chills, diaphoresis, fever,  malaise/fatigue and weight loss.   HENT:  Negative for hearing loss, nosebleeds, sinus pain and sore throat.    Eyes:  Negative for blurred vision and double vision.   Respiratory:  Negative for cough, sputum production, shortness of breath and wheezing.    Cardiovascular:  Positive for leg swelling. Negative for chest pain, palpitations and orthopnea.   Gastrointestinal:  Negative for abdominal pain, blood in stool, constipation, diarrhea, heartburn, melena, nausea and vomiting.   Genitourinary:  Negative for dysuria, frequency, hematuria and urgency.   Musculoskeletal:  Negative for back pain, joint pain and myalgias.   Skin:  Negative for rash.   Neurological:  Negative for dizziness, tingling, weakness and headaches.   Endo/Heme/Allergies:  Does not bruise/bleed easily.   Psychiatric/Behavioral:  The patient is not nervous/anxious and does not have insomnia.          Physical Exam:  There were no vitals filed for this visit.      DESC; KARNOFSKY SCALE WITH ECOG EQUIVALENT: 90, Able to carry on normal activity; minor signs or symptoms of disease (ECOG equivalent 0)    DISTRESS LEVEL: no apparent distress    Physical Exam  Vitals and nursing note reviewed.   Constitutional:       General: She is awake. She is not in acute distress.     Appearance: Normal appearance. She is obese. She is not ill-appearing, toxic-appearing or diaphoretic.   HENT:      Head: Normocephalic and atraumatic.   Eyes:      Extraocular Movements: Extraocular movements intact.      Conjunctiva/sclera: Conjunctivae normal.   Pulmonary:      Effort: Pulmonary effort is normal.      Breath sounds: No decreased air movement.   Musculoskeletal:         General: Normal range of motion.      Cervical back: Normal range of motion.   Lymphadenopathy:      Upper Body:      Right upper body: No axillary adenopathy.      Left upper body: No axillary adenopathy.      Lower Body: No right inguinal adenopathy. No left inguinal adenopathy.   Skin:      Coloration: Skin is not jaundiced.      Findings: No erythema or rash.   Neurological:      General: No focal deficit present.      Mental Status: She is alert and oriented to person, place, and time.      Sensory: Sensation is intact.      Motor: Motor function is intact. No weakness.      Gait: Gait is intact.   Psychiatric:         Attention and Perception: Attention normal.         Mood and Affect: Mood normal. Affect is not tearful.         Behavior: Behavior normal. Behavior is cooperative.         Thought Content: Thought content normal.         Judgment: Judgment normal.           Labs:  Hospital Outpatient Visit on 04/04/2025   Component Date Value Ref Range Status    Sodium 04/04/2025 122 (L)  135 - 145 mmol/L Final    Potassium 04/04/2025 4.6  3.6 - 5.5 mmol/L Final    Chloride 04/04/2025 87 (L)  96 - 112 mmol/L Final    Co2 04/04/2025 25  20 - 33 mmol/L Final    Glucose 04/04/2025 108 (H)  65 - 99 mg/dL Final    Bun 04/04/2025 23 (H)  8 - 22 mg/dL Final    Creatinine 04/04/2025 1.27  0.50 - 1.40 mg/dL Final    Calcium 04/04/2025 9.3  8.5 - 10.5 mg/dL Final    Anion Gap 04/04/2025 10.0  7.0 - 16.0 Final    GFR (CKD-EPI) 04/04/2025 43 (A)  >60 mL/min/1.73 m 2 Final    Comment: Estimated Glomerular Filtration Rate is calculated using  race neutral CKD-EPI 2021 equation per NKF-ASN recommendations.         Imaging:     All listed images below have been independently reviewed by me. I agree with the findings as summarized below:    No results found.    Pathology:  Reported [id number] stage II-a disease (adenocarcinoma, MSI-S)     Signatera 04/18/24: Not detected    Assessment & Plan:  1. Rectal adenocarcinoma (HCC)          This is a now 80 year old  woman with adenocarcinoma of the rectosigmoid colon, s/p resection in 02/2024. She presents for evaluation.      Current Diagnosis and Staging: Adenocarcinoma of the rectosigmoid colon, [id number] stage II-a disease, MSI-S    Update: Patient has significant  hyponatremia. Advised her to take in electrolytes and salt as she seems to have diluted herself. Will have her get her labs rechecked ASAP. Advised her to restrict her water intake at this time and to monitor accordingly. She can use Lasix PRN to help with fluid retention otherwise. She will get her scans done and then we'll monitor accordingly.      Treatment Plan: Surveillance     Treatment Citation: NCCN     Plan of Care:     Primary Therapy: NA  Supportive Therapy: Medical management per primary. Advised fluid restriction and increased salt intake given hemodilute status and hyponatremia.   Toxicity: NA   Labs: CBC with diff, CMP, CEA monitoring. ctDNA monitoring. Repeat BMP now.   Imaging: CT c/a/p due 04/2025  Treatment Planning: Patient has reported stage II-a disease, which was potentially more sigmoid than rectal adenocarcinoma. ctDNA testing was negative, so we will go on surveillance.   Consultations: Colorectal surgery (Dr. Matamoros)  Code Status: Full  Miscellaneous: NA  Return for Follow Up: 3 months (call with scan results)    Any questions and concerns raised by the patient were answered to the best of my ability. Thank you for allowing me to participate in the care for this patient. Please feel free to contact me for any questions or concerns.       Total time spent on chart review, clinic encounter, and documentation: 27 minutes.

## 2025-04-07 NOTE — Clinical Note
Can you get her scans rescheduled for now so we can keep her on track? Follow up in 3 months otherwise.

## 2025-04-10 ENCOUNTER — TELEPHONE (OUTPATIENT)
Dept: HEMATOLOGY ONCOLOGY | Facility: MEDICAL CENTER | Age: 80
End: 2025-04-10
Payer: MEDICARE

## 2025-04-10 ENCOUNTER — HOSPITAL ENCOUNTER (OUTPATIENT)
Dept: LAB | Facility: MEDICAL CENTER | Age: 80
End: 2025-04-10
Attending: STUDENT IN AN ORGANIZED HEALTH CARE EDUCATION/TRAINING PROGRAM
Payer: MEDICARE

## 2025-04-10 DIAGNOSIS — E87.1 HYPONATREMIA: ICD-10-CM

## 2025-04-10 DIAGNOSIS — C20 RECTAL ADENOCARCINOMA (HCC): ICD-10-CM

## 2025-04-10 LAB
ANION GAP SERPL CALC-SCNC: 13 MMOL/L (ref 7–16)
BUN SERPL-MCNC: 39 MG/DL (ref 8–22)
CALCIUM SERPL-MCNC: 9.8 MG/DL (ref 8.5–10.5)
CHLORIDE SERPL-SCNC: 97 MMOL/L (ref 96–112)
CO2 SERPL-SCNC: 25 MMOL/L (ref 20–33)
CREAT SERPL-MCNC: 1.59 MG/DL (ref 0.5–1.4)
GFR SERPLBLD CREATININE-BSD FMLA CKD-EPI: 33 ML/MIN/1.73 M 2
GLUCOSE SERPL-MCNC: 101 MG/DL (ref 65–99)
POTASSIUM SERPL-SCNC: 4.7 MMOL/L (ref 3.6–5.5)
SODIUM SERPL-SCNC: 135 MMOL/L (ref 135–145)

## 2025-04-10 PROCEDURE — 36415 COLL VENOUS BLD VENIPUNCTURE: CPT

## 2025-04-10 PROCEDURE — 80048 BASIC METABOLIC PNL TOTAL CA: CPT

## 2025-04-25 ENCOUNTER — HOSPITAL ENCOUNTER (OUTPATIENT)
Dept: RADIOLOGY | Facility: MEDICAL CENTER | Age: 80
End: 2025-04-25
Attending: STUDENT IN AN ORGANIZED HEALTH CARE EDUCATION/TRAINING PROGRAM
Payer: MEDICARE

## 2025-04-25 DIAGNOSIS — C20 RECTAL ADENOCARCINOMA (HCC): ICD-10-CM

## 2025-04-25 PROCEDURE — 700117 HCHG RX CONTRAST REV CODE 255: Performed by: STUDENT IN AN ORGANIZED HEALTH CARE EDUCATION/TRAINING PROGRAM

## 2025-04-25 PROCEDURE — 71260 CT THORAX DX C+: CPT

## 2025-04-25 RX ADMIN — IOHEXOL 100 ML: 350 INJECTION, SOLUTION INTRAVENOUS at 15:39

## 2025-06-23 LAB
NTRA SIGNATERA MTM READOUT: 0 MTM/ML
NTRA SIGNATERA TEST RESULT: NEGATIVE

## 2025-07-07 ENCOUNTER — HOSPITAL ENCOUNTER (OUTPATIENT)
Dept: LAB | Facility: MEDICAL CENTER | Age: 80
End: 2025-07-07
Attending: STUDENT IN AN ORGANIZED HEALTH CARE EDUCATION/TRAINING PROGRAM
Payer: MEDICARE

## 2025-07-07 DIAGNOSIS — Z01.812 BLOOD TESTS PRIOR TO TREATMENT OR PROCEDURE: ICD-10-CM

## 2025-07-07 DIAGNOSIS — C20 RECTAL ADENOCARCINOMA (HCC): Primary | ICD-10-CM

## 2025-07-07 DIAGNOSIS — C20 RECTAL ADENOCARCINOMA (HCC): ICD-10-CM

## 2025-07-07 LAB
BASOPHILS # BLD AUTO: 0.6 % (ref 0–1.8)
BASOPHILS # BLD: 0.05 K/UL (ref 0–0.12)
EOSINOPHIL # BLD AUTO: 0.22 K/UL (ref 0–0.51)
EOSINOPHIL NFR BLD: 2.5 % (ref 0–6.9)
ERYTHROCYTE [DISTWIDTH] IN BLOOD BY AUTOMATED COUNT: 50.9 FL (ref 35.9–50)
HCT VFR BLD AUTO: 37.9 % (ref 37–47)
HGB BLD-MCNC: 12.5 G/DL (ref 12–16)
IMM GRANULOCYTES # BLD AUTO: 0.03 K/UL (ref 0–0.11)
IMM GRANULOCYTES NFR BLD AUTO: 0.3 % (ref 0–0.9)
LYMPHOCYTES # BLD AUTO: 2.55 K/UL (ref 1–4.8)
LYMPHOCYTES NFR BLD: 28.9 % (ref 22–41)
MCH RBC QN AUTO: 30.8 PG (ref 27–33)
MCHC RBC AUTO-ENTMCNC: 33 G/DL (ref 32.2–35.5)
MCV RBC AUTO: 93.3 FL (ref 81.4–97.8)
MONOCYTES # BLD AUTO: 0.78 K/UL (ref 0–0.85)
MONOCYTES NFR BLD AUTO: 8.8 % (ref 0–13.4)
NEUTROPHILS # BLD AUTO: 5.19 K/UL (ref 1.82–7.42)
NEUTROPHILS NFR BLD: 58.9 % (ref 44–72)
NRBC # BLD AUTO: 0 K/UL
NRBC BLD-RTO: 0 /100 WBC (ref 0–0.2)
PLATELET # BLD AUTO: 251 K/UL (ref 164–446)
PMV BLD AUTO: 10.6 FL (ref 9–12.9)
RBC # BLD AUTO: 4.06 M/UL (ref 4.2–5.4)
WBC # BLD AUTO: 8.8 K/UL (ref 4.8–10.8)

## 2025-07-07 PROCEDURE — 80053 COMPREHEN METABOLIC PANEL: CPT

## 2025-07-07 PROCEDURE — 36415 COLL VENOUS BLD VENIPUNCTURE: CPT

## 2025-07-07 PROCEDURE — 85025 COMPLETE CBC W/AUTO DIFF WBC: CPT

## 2025-07-07 PROCEDURE — 82378 CARCINOEMBRYONIC ANTIGEN: CPT

## 2025-07-08 LAB
ALBUMIN SERPL BCP-MCNC: 4 G/DL (ref 3.2–4.9)
ALBUMIN/GLOB SERPL: 1.2 G/DL
ALP SERPL-CCNC: 89 U/L (ref 30–99)
ALT SERPL-CCNC: 13 U/L (ref 2–50)
ANION GAP SERPL CALC-SCNC: 12 MMOL/L (ref 7–16)
AST SERPL-CCNC: 17 U/L (ref 12–45)
BILIRUB SERPL-MCNC: 0.3 MG/DL (ref 0.1–1.5)
BUN SERPL-MCNC: 30 MG/DL (ref 8–22)
CALCIUM ALBUM COR SERPL-MCNC: 9.3 MG/DL (ref 8.5–10.5)
CALCIUM SERPL-MCNC: 9.3 MG/DL (ref 8.5–10.5)
CEA SERPL-MCNC: 4.4 NG/ML (ref 0–3)
CHLORIDE SERPL-SCNC: 94 MMOL/L (ref 96–112)
CO2 SERPL-SCNC: 23 MMOL/L (ref 20–33)
CREAT SERPL-MCNC: 1.35 MG/DL (ref 0.5–1.4)
GFR SERPLBLD CREATININE-BSD FMLA CKD-EPI: 40 ML/MIN/1.73 M 2
GLOBULIN SER CALC-MCNC: 3.3 G/DL (ref 1.9–3.5)
GLUCOSE SERPL-MCNC: 110 MG/DL (ref 65–99)
POTASSIUM SERPL-SCNC: 4.9 MMOL/L (ref 3.6–5.5)
PROT SERPL-MCNC: 7.3 G/DL (ref 6–8.2)
SODIUM SERPL-SCNC: 129 MMOL/L (ref 135–145)

## 2025-07-11 ENCOUNTER — HOSPITAL ENCOUNTER (OUTPATIENT)
Dept: HEMATOLOGY ONCOLOGY | Facility: MEDICAL CENTER | Age: 80
End: 2025-07-11
Attending: STUDENT IN AN ORGANIZED HEALTH CARE EDUCATION/TRAINING PROGRAM
Payer: MEDICARE

## 2025-07-11 VITALS — BODY MASS INDEX: 35.86 KG/M2 | HEIGHT: 64 IN

## 2025-07-11 DIAGNOSIS — N18.32 STAGE 3B CHRONIC KIDNEY DISEASE: ICD-10-CM

## 2025-07-11 DIAGNOSIS — C20 RECTAL ADENOCARCINOMA (HCC): Primary | ICD-10-CM

## 2025-07-11 PROCEDURE — 99214 OFFICE O/P EST MOD 30 MIN: CPT | Mod: 95 | Performed by: STUDENT IN AN ORGANIZED HEALTH CARE EDUCATION/TRAINING PROGRAM

## 2025-07-11 ASSESSMENT — ENCOUNTER SYMPTOMS
DIARRHEA: 0
BRUISES/BLEEDS EASILY: 0
DIAPHORESIS: 0
INSOMNIA: 0
CONSTIPATION: 0
SHORTNESS OF BREATH: 0
NAUSEA: 0
HEARTBURN: 0
DOUBLE VISION: 0
SPUTUM PRODUCTION: 0
ABDOMINAL PAIN: 0
ORTHOPNEA: 0
MYALGIAS: 0
BLURRED VISION: 0
COUGH: 0
SINUS PAIN: 0
CHILLS: 0
BACK PAIN: 0
PALPITATIONS: 0
DIZZINESS: 0
BLOOD IN STOOL: 0
TINGLING: 0
WEIGHT LOSS: 0
FEVER: 0
SORE THROAT: 0
VOMITING: 0
HEADACHES: 0
WEAKNESS: 0
NERVOUS/ANXIOUS: 0
WHEEZING: 0

## 2025-07-11 NOTE — PROGRESS NOTES
Follow Up Note:  Hematology/Oncology    This evaluation was conducted via Teams using secure and encrypted videoconferencing technology. The patient was in their home in the [facility].    The patient's identity was confirmed and verbal consent was obtained for this virtual visit.      Primary Care:  Elian Torres M.D.    Diagnosis: Rectal/sigmoid adenocarcinoma    Chief Complaint: Surveillance visit    Current Treatment: Surveillance    Prior Treatment: Surgical resection    Oncology History of Presenting Illness:  Kaia Sorto is a 79 y.o.  woman who presents to the clinic for evaluation for systemic therapy for diagnosis of adenocarcinoma of the rectosigmoid area.  The patient was found on colonoscopy to have a lesion 15 to 18 cm from the anus in November 2023.  She had a rectal MRI which showed a potential T1 or T2 lesion, with no lymph nodes.  She was seen by colorectal surgery and was referred for evaluation for potential radiation therapy as well as chemotherapy.  She was unable to get scheduled with medical oncology at that time, and then ultimately ended up going for surgery alone in February 2024.  The final pathology reports showed a pT3 N0 M0 stage IIa disease.  She was subsequently referred to me for evaluation for therapy.     Treatment History:   02/2024: Surgical resection, [id number] stage II-a disease    Interval History:  Patient presents virtually for follow up. She is doing well for the most part without any new complaints.     Allergies as of 07/11/2025 - Reviewed 07/11/2025   Allergen Reaction Noted    Food  04/17/2024         Current Outpatient Medications:     ELIQUIS 5 MG Tab, Take 1 Tablet by mouth 2 times a day., Disp: , Rfl:     buPROPion SR (WELLBUTRIN-SR) 150 MG TABLET SR 12 HR sustained-release tablet, TAKE 2 TABLETS BY MOUTH IN THE MORNING AND THEN TAKE 1 TABLET IN THE AFTERNOON, Disp: , Rfl:     clonazePAM (KLONOPIN) 0.5 MG Tab, TAKE 1/2 TO 1 (ONE-HALF TO ONE) TABLET  BY MOUTH ONCE DAILY AS NEEDED FOR STRESS OR ANXIETY, Disp: , Rfl:     traZODone (DESYREL) 50 MG Tab, Take 50 mg by mouth at bedtime as needed for Sleep., Disp: , Rfl:     melatonin 3 MG Tab, Take 3 mg by mouth every bedtime., Disp: , Rfl:     levothyroxine (SYNTHROID) 75 MCG Tab, Take 75 mcg by mouth Every morning on an empty stomach., Disp: , Rfl:     Cholecalciferol 1.25 MG (14436 UT) Tab, Take  by mouth., Disp: , Rfl:     valsartan-hydrochlorothiazide (DIOVAN-HCT) 320-25 MG per tablet, Take 1 Tablet by mouth every day. (Patient not taking: Reported on 7/11/2025), Disp: , Rfl:     predniSONE (DELTASONE) 10 MG Tab, Take 10 mg by mouth every day. (Patient not taking: Reported on 4/17/2024), Disp: , Rfl:     predniSONE (DELTASONE) 20 MG Tab, Take 2 tabs po daily for 5 days. (Patient not taking: Reported on 1/13/2025), Disp: 10 Tab, Rfl: 0    NON SPECIFIED, Currently taking a muscle relaxer, Disp: , Rfl:     MethylPREDNISolone (MEDROL DOSEPAK) 4 MG Tablet Therapy Pack, follow package directions (Patient not taking: Reported on 1/13/2025), Disp: 21 Tab, Rfl: 0    celecoxib (CELEBREX) 100 MG Cap, Take 1 Cap by mouth 2 times a day., Disp: 60 Cap, Rfl: 0    baclofen (LIORESAL) 10 MG Tab, Take 1 Tab by mouth at bedtime as needed., Disp: 15 Tab, Rfl: 0    fluticasone (FLONASE) 50 MCG/ACT nasal spray, Spray 2 Sprays in nose every day., Disp: 1 Bottle, Rfl: 3    Diclofenac Sodium 1 % Gel, Apply 1 Application to skin as directed 3 times a day., Disp: 1 Tube, Rfl: 0    OLMESARTAN-AMLODIPINE-HCTZ PO, Take  by mouth., Disp: , Rfl:       Review of Systems:  Review of Systems   Constitutional:  Negative for chills, diaphoresis, fever, malaise/fatigue and weight loss.   HENT:  Negative for hearing loss, nosebleeds, sinus pain and sore throat.    Eyes:  Negative for blurred vision and double vision.   Respiratory:  Negative for cough, sputum production, shortness of breath and wheezing.    Cardiovascular:  Positive for leg swelling.  "Negative for chest pain, palpitations and orthopnea.   Gastrointestinal:  Negative for abdominal pain, blood in stool, constipation, diarrhea, heartburn, melena, nausea and vomiting.   Genitourinary:  Negative for dysuria, frequency, hematuria and urgency.   Musculoskeletal:  Negative for back pain, joint pain and myalgias.   Skin:  Negative for rash.   Neurological:  Negative for dizziness, tingling, weakness and headaches.   Endo/Heme/Allergies:  Does not bruise/bleed easily.   Psychiatric/Behavioral:  The patient is not nervous/anxious and does not have insomnia.          Physical Exam:  Vitals:    07/11/25 1038   Height: 1.626 m (5' 4.02\")         DESC; KARNOFSKY SCALE WITH ECOG EQUIVALENT: 80, Normal activity with effort; some signs or symptoms of disease (ECOG equivalent 1)    DISTRESS LEVEL: no apparent distress    Physical Exam  Vitals and nursing note reviewed.   Constitutional:       General: She is awake. She is not in acute distress.     Appearance: Normal appearance. She is obese. She is not ill-appearing, toxic-appearing or diaphoretic.   HENT:      Head: Normocephalic and atraumatic.   Eyes:      Extraocular Movements: Extraocular movements intact.      Conjunctiva/sclera: Conjunctivae normal.   Pulmonary:      Effort: Pulmonary effort is normal.      Breath sounds: No decreased air movement.   Musculoskeletal:         General: Normal range of motion.      Cervical back: Normal range of motion.   Lymphadenopathy:      Upper Body:      Right upper body: No axillary adenopathy.      Left upper body: No axillary adenopathy.      Lower Body: No right inguinal adenopathy. No left inguinal adenopathy.   Skin:     Coloration: Skin is not jaundiced.      Findings: No erythema or rash.   Neurological:      General: No focal deficit present.      Mental Status: She is alert and oriented to person, place, and time.      Sensory: Sensation is intact.      Motor: Motor function is intact. No weakness.      Gait: " Gait is intact.   Psychiatric:         Attention and Perception: Attention normal.         Mood and Affect: Mood normal. Affect is not tearful.         Behavior: Behavior normal. Behavior is cooperative.         Thought Content: Thought content normal.         Judgment: Judgment normal.           Labs:  Hospital Outpatient Visit on 07/07/2025   Component Date Value Ref Range Status    Carcinoembryonic Antigen 07/07/2025 4.4 (H)  0.0 - 3.0 ng/mL Final    Comment: Performed using Roche shanthi e immunoassay analyzer. CEA values determined on  patient samples by different testing procedures cannot be directly compared  with one another and could be the cause of erroneous medical  interpretations. If there is a change in the CEA assay procedure used while  monitoring therapy, then new baselines may need to be established when  comparing previous results.      Sodium 07/07/2025 129 (L)  135 - 145 mmol/L Final    Potassium 07/07/2025 4.9  3.6 - 5.5 mmol/L Final    Chloride 07/07/2025 94 (L)  96 - 112 mmol/L Final    Co2 07/07/2025 23  20 - 33 mmol/L Final    Anion Gap 07/07/2025 12.0  7.0 - 16.0 Final    Glucose 07/07/2025 110 (H)  65 - 99 mg/dL Final    Bun 07/07/2025 30 (H)  8 - 22 mg/dL Final    Creatinine 07/07/2025 1.35  0.50 - 1.40 mg/dL Final    Calcium 07/07/2025 9.3  8.5 - 10.5 mg/dL Final    Correct Calcium 07/07/2025 9.3  8.5 - 10.5 mg/dL Final    AST(SGOT) 07/07/2025 17  12 - 45 U/L Final    ALT(SGPT) 07/07/2025 13  2 - 50 U/L Final    Alkaline Phosphatase 07/07/2025 89  30 - 99 U/L Final    Total Bilirubin 07/07/2025 0.3  0.1 - 1.5 mg/dL Final    Albumin 07/07/2025 4.0  3.2 - 4.9 g/dL Final    Total Protein 07/07/2025 7.3  6.0 - 8.2 g/dL Final    Globulin 07/07/2025 3.3  1.9 - 3.5 g/dL Final    A-G Ratio 07/07/2025 1.2  g/dL Final    WBC 07/07/2025 8.8  4.8 - 10.8 K/uL Final    RBC 07/07/2025 4.06 (L)  4.20 - 5.40 M/uL Final    Hemoglobin 07/07/2025 12.5  12.0 - 16.0 g/dL Final    Hematocrit 07/07/2025 37.9   37.0 - 47.0 % Final    MCV 07/07/2025 93.3  81.4 - 97.8 fL Final    MCH 07/07/2025 30.8  27.0 - 33.0 pg Final    MCHC 07/07/2025 33.0  32.2 - 35.5 g/dL Final    RDW 07/07/2025 50.9 (H)  35.9 - 50.0 fL Final    Platelet Count 07/07/2025 251  164 - 446 K/uL Final    MPV 07/07/2025 10.6  9.0 - 12.9 fL Final    Neutrophils-Polys 07/07/2025 58.90  44.00 - 72.00 % Final    Lymphocytes 07/07/2025 28.90  22.00 - 41.00 % Final    Monocytes 07/07/2025 8.80  0.00 - 13.40 % Final    Eosinophils 07/07/2025 2.50  0.00 - 6.90 % Final    Basophils 07/07/2025 0.60  0.00 - 1.80 % Final    Immature Granulocytes 07/07/2025 0.30  0.00 - 0.90 % Final    Nucleated RBC 07/07/2025 0.00  0.00 - 0.20 /100 WBC Final    Neutrophils (Absolute) 07/07/2025 5.19  1.82 - 7.42 K/uL Final    Includes immature neutrophils, if present.    Lymphs (Absolute) 07/07/2025 2.55  1.00 - 4.80 K/uL Final    Monos (Absolute) 07/07/2025 0.78  0.00 - 0.85 K/uL Final    Eos (Absolute) 07/07/2025 0.22  0.00 - 0.51 K/uL Final    Baso (Absolute) 07/07/2025 0.05  0.00 - 0.12 K/uL Final    Immature Granulocytes (abs) 07/07/2025 0.03  0.00 - 0.11 K/uL Final    NRBC (Absolute) 07/07/2025 0.00  K/uL Final    GFR (CKD-EPI) 07/07/2025 40 (A)  >60 mL/min/1.73 m 2 Final    Comment: Estimated Glomerular Filtration Rate is calculated using  race neutral CKD-EPI 2021 equation per NKF-ASN recommendations.       Imaging:     All listed images below have been independently reviewed by me. I agree with the findings as summarized below:    CT c/a/p 04/25/25:    IMPRESSION:     1.  No metastatic disease in the chest, abdomen or pelvis.  2.  Slightly smaller presumably degenerated fibroid in the uterus, measuring 7.3 x 7.1 cm.    Pathology:  Reported [id number] stage II-a disease (adenocarcinoma, MSI-S)     Signatera 04/18/24: Not detected    Assessment & Plan:  1. Rectal adenocarcinoma (HCC)          This is a now 80 year old  woman with adenocarcinoma of the rectosigmoid colon,  s/p resection in 02/2024. She presents for evaluation.      Current Diagnosis and Staging: Adenocarcinoma of the rectosigmoid colon, [id number] stage II-a disease, MSI-S    Update: Patient is doing well at this time. Continue surveillance.     Treatment Plan: Surveillance     Treatment Citation: NCCN     Plan of Care:     Primary Therapy: NA  Supportive Therapy: Medical management per primary.   Toxicity: NA   Labs: CBC with diff, CMP, CEA monitoring. ctDNA monitoring.   Imaging: CT c/a/p due 10/2025  Treatment Planning: Patient has reported stage II-a disease, which was potentially more sigmoid than rectal adenocarcinoma. ctDNA testing was negative, so we will go on surveillance.   Consultations: Colorectal surgery (Dr. Matamoros)  Code Status: Full  Miscellaneous: NA  Return for Follow Up: 3 months    Any questions and concerns raised by the patient were answered to the best of my ability. Thank you for allowing me to participate in the care for this patient. Please feel free to contact me for any questions or concerns.

## 2025-07-11 NOTE — Clinical Note
3 month follow up with scans beforehand please. Can be virtual or in person, whichever she prefers.